# Patient Record
Sex: FEMALE | Race: ASIAN | NOT HISPANIC OR LATINO
[De-identification: names, ages, dates, MRNs, and addresses within clinical notes are randomized per-mention and may not be internally consistent; named-entity substitution may affect disease eponyms.]

---

## 2017-06-10 ENCOUNTER — HEALTH MAINTENANCE LETTER (OUTPATIENT)
Age: 37
End: 2017-06-10

## 2017-11-08 ENCOUNTER — OFFICE VISIT (OUTPATIENT)
Dept: FAMILY MEDICINE | Facility: CLINIC | Age: 37
End: 2017-11-08

## 2017-11-08 VITALS
WEIGHT: 169 LBS | SYSTOLIC BLOOD PRESSURE: 143 MMHG | HEART RATE: 75 BPM | TEMPERATURE: 98.4 F | BODY MASS INDEX: 30.66 KG/M2 | DIASTOLIC BLOOD PRESSURE: 99 MMHG

## 2017-11-08 DIAGNOSIS — Z00.00 HEALTH CARE MAINTENANCE: ICD-10-CM

## 2017-11-08 DIAGNOSIS — Z30.433 ENCOUNTER FOR REMOVAL AND REINSERTION OF INTRAUTERINE CONTRACEPTIVE DEVICE: Primary | ICD-10-CM

## 2017-11-08 DIAGNOSIS — I10 ESSENTIAL HYPERTENSION: ICD-10-CM

## 2017-11-08 LAB
BUN SERPL-MCNC: 7.7 MG/DL (ref 7–19)
CALCIUM SERPL-MCNC: 9.4 MG/DL (ref 8.5–10.1)
CHLORIDE SERPLBLD-SCNC: 101.6 MMOL/L (ref 98–110)
CHOLEST SERPL-MCNC: 220.1 MG/DL (ref 0–200)
CHOLEST/HDLC SERPL: 5.3 {RATIO} (ref 0–5)
CO2 SERPL-SCNC: 29.7 MMOL/L (ref 20–32)
CREAT SERPL-MCNC: 0.6 MG/DL (ref 0.5–1)
GFR SERPL CREATININE-BSD FRML MDRD: >90 ML/MIN/1.7 M2
GLUCOSE SERPL-MCNC: 115.6 MG'DL (ref 70–99)
HDLC SERPL-MCNC: 41.7 MG/DL
LDLC SERPL CALC-MCNC: 147 MG/DL (ref 0–129)
POTASSIUM SERPL-SCNC: 3.6 MMOL/DL (ref 3.2–4.6)
SODIUM SERPL-SCNC: 137.5 MMOL/L (ref 132–142)
TRIGL SERPL-MCNC: 156.6 MG/DL (ref 0–150)
VLDL CHOLESTEROL: 31.3 MG/DL (ref 7–32)

## 2017-11-08 RX ORDER — LOSARTAN POTASSIUM AND HYDROCHLOROTHIAZIDE 12.5; 5 MG/1; MG/1
1 TABLET ORAL DAILY
Qty: 90 TABLET | Refills: 3 | Status: SHIPPED | OUTPATIENT
Start: 2017-11-08 | End: 2020-04-15 | Stop reason: ALTCHOICE

## 2017-11-08 NOTE — PROGRESS NOTES
There are no exam notes on file for this visit.  Chief Complaint   Patient presents with     Contraception     removal and insertion of IUD     Medication Refill     Blood pressure (!) 143/99, pulse 75, temperature 98.4  F (36.9  C), temperature source Oral, weight 169 lb (76.7 kg).    Patient comes in today to restart her high blood pressure medicine.  Additionally, she would like to have her current Mirena taken out and a new one placed.    The patient tells me that she has not been taking her blood pressure medicine over the past year.  She has done this because of insurance problems.  She now has insurance and so would like a refill of her medications.    Patient tells me that she has been amenorrheic since the placement of her previous Mirena.  She has been tolerating it well with occasional spotting on very rare occasions.  Otherwise she is doing quite well and would like a continuation of this.  It has been over 5 years since the previous Mirena was placed.    The patient and I discussed the fact that she has not had a Pap smear recently.  She is willing to get that today.    Objective: This is a well-nourished well-developed female who is in no acute distress.  Please see vital signs as noted above.  The cervix is suspended without difficulty with a nonsterile speculum, and a Pap smear is performed.  Following this, a sterile speculum was inserted and the previous IUD was removed and the new IUD is placed.  Please see procedure note below.    Assessment:  #1 desire for exchange of  intrauterine device #2 hypertension #3 healthcare maintenance    Plan:  for her hypertension I have reviewed her medication for the upcoming year.  Will check a BMP to ensure that her creatinine and electrolytes are normal.  I will check a lipid panel for health care maintenance.    The patient was counseled regarding alternative forms of contraception and all her questions answered to her satisfaction.     Encounter for removal  and reinsertion of intrauterine contraceptive device  -     levonorgestrel (MIRENA) 20 MCG/24HR IUD; 1 each (20 mcg) by Intrauterine route continuous  -     REMOVE INTRAUTERINE DEVICE  -     INSERTION INTRAUTERINE DEVICE  -     HC LEVONORGESTREL IU 52MG 5 YR    Essential hypertension  -     Basic Metabolic Panel ([facility])  - Results < 1 hr  -     Lipid Panel ([place])  -     losartan-hydrochlorothiazide (HYZAAR) 50-12.5 MG per tablet; Take 1 tablet by mouth daily    Health care maintenance  -     GYN Cytology ([facility])    IUD Insertion Note    Demetra Rodrigues is a patient of Markell Urbina here for IUD placement.   Indication: two previous IUDs.  Not ready for permanent procedure yet.  Tolerating Mirena well, 5 years since placement    Counselling: Discussed potential side effects of Paragard, including increased bleeding and cramping, as well as the Mirena, including unpredictable spotting and amenorrhea.  Patient aware to check for strings every month.    Consent: Affirmation of informed consent was signed and scanned into the medical record. Risks, benefits and alternatives were discussed including small risk of uterine perforation during insertion. Patient's questions were elicited and answered.   Procedure safety checklist was completed:  Yes  Time Out (Pause for the Cause) completed: Yes    Labs: UPT not done    Patient chooses this IUD type: Mirena    Technique:   Patient was premedicated with ibuprofen:   No  Uterine position was confirmed by bimanual exam: Yes   Using a sterile speculum and equipment, the cervix  was examined.     The IUD strings were easily visible.  With gentle traction, the IUD was removed without difficulty, and shown to the patient.    A GC-Chlamydia probe was collected:   No     The cervix was cleansed with Betadine prep. Tenaculum was applied to cervix with tension to straighten cervical canal. The uterus was sounded to 7 cm. The Mirena insertion apparatus was prepared and  introduced into the cervix without significant resistance.  The IUD was placed in the uterus. A firm endpoint was felt with the passage of the sound and the IUD.   The strings were trimmed 3 cm below the cervix.     IUD Lot #: [id number]  EBL: minimal  Complications: No.    Tolerance: Pt tolerated procedure well and was in stable condition. The patient had some minor cramping after insertion of the IUD.  This resolved as the patient was in the office.      Follow up: Pt was instructed to call if heavy bleeding, severe cramping or foul smelling discharge. May take 400-600 mg ibuprofen TID prn for mild cramping. Pt should return in one month for IUD string check. Pt instructed she requires a new IUD device in 5 years.     Faculty: Markell Hancock MD present for and performed this entire procedure.

## 2017-11-08 NOTE — LETTER
November 16, 2017      Demetraconnor Rodrigues  [address] [facility] [address]        Dear Demetra,    Your pap smear is normal    Please see below for your test results.    Resulted Orders   Basic Metabolic Panel ([facility])  - Results < 1 hr   Result Value Ref Range    Urea Nitrogen 7.7 7.0 - 19.0 mg/dL    Calcium 9.4 8.5 - 10.1 mg/dL    Chloride 101.6 98.0 - 110.0 mmol/L    Carbon Dioxide 29.7 20.0 - 32.0 mmol/L    Creatinine 0.6 0.5 - 1.0 mg/dL    Glucose 115.6 (H) 70.0 - 99.0 mg'dL    Potassium 3.6 3.2 - 4.6 mmol/dL    Sodium 137.5 132.0 - 142.0 mmol/L    GFR Estimate >90 >60.0 mL/min/1.7 m2    GFR Estimate If Black >90 >60.0 mL/min/1.7 m2   Lipid Panel ([place])   Result Value Ref Range    Cholesterol 220.1 (H) 0.0 - 200.0 mg/dL    Cholesterol/HDL Ratio 5.3 (H) 0.0 - 5.0    HDL Cholesterol 41.7 >40.0 mg/dL    LDL Cholesterol Calculated 147 (H) 0 - 129 mg/dL    Triglycerides 156.6 (H) 0.0 - 150.0 mg/dL    VLDL Cholesterol 31.3 7.0 - 32.0 mg/dL   GYN Cytology ([facility])   Result Value Ref Range    Lab AP Case Report SEE RESULTS BELOW       Comment:      Gynecologic Cytology Report                       Case: [id number]                                     Authorizing Provider:  Markell Mahoney MD      Collected:             11/08/2017 1611              First Screen:          RADHA Carmona       Received:              11/09/2017 1048                                     (ASCP)                                                                         Rescreen:              RADHA Tadeo                                                                               (ASCP)                                                                         Specimen:    SUREPATH PAP, SCREENING, Endocervical/cervical                                               Lab AP Gyn Interpretation SEE RESULTS BELOW       Comment:      Negative for squamous intraepithelial lesion or malignancy  Electronically signed by Floresita Chen  CT (ASCP) on 11/15/2017 at  3:09   PM      Lab AP Malignant? Normal Normal    Lab AP Gyn Other Findings       Bacteria morphologically consistent with Actinomyces spp    Specimen adequacy:       Satisfactory for evaluation, endocervical/transformation zone component present    HPV Reflex? Yes if Abnormal     High Risk? No     Last Mens Period 5 years ago     Lab AP Abnormal Bleeding No     Lab AP Patient Status n/a     Lab AP Birth Control/Hormones IUD-Hormone     Lab AP Previous Normal Uncertain     Lab AP Previous Abnl n/a     Lab AP Cervical Appearance normal     Narrative    Test performed by:  [facility] [address] [place] [address]       If you have any questions, please call the clinic to make an appointment.    Sincerely,    Markell Hancock MD

## 2017-11-08 NOTE — LETTER
November 13, 2017      Demetra Rodrigues  [address] [facility] [address]        Dear Demetra,  Your cholesterol is high.  We should recheck this in 3-6 months.                Please see below for your test results.    Resulted Orders   Basic Metabolic Panel ([facility])  - Results < 1 hr   Result Value Ref Range    Urea Nitrogen 7.7 7.0 - 19.0 mg/dL    Calcium 9.4 8.5 - 10.1 mg/dL    Chloride 101.6 98.0 - 110.0 mmol/L    Carbon Dioxide 29.7 20.0 - 32.0 mmol/L    Creatinine 0.6 0.5 - 1.0 mg/dL    Glucose 115.6 (H) 70.0 - 99.0 mg'dL    Potassium 3.6 3.2 - 4.6 mmol/dL    Sodium 137.5 132.0 - 142.0 mmol/L    GFR Estimate >90 >60.0 mL/min/1.7 m2    GFR Estimate If Black >90 >60.0 mL/min/1.7 m2   Lipid Panel ([place])   Result Value Ref Range    Cholesterol 220.1 (H) 0.0 - 200.0 mg/dL    Cholesterol/HDL Ratio 5.3 (H) 0.0 - 5.0    HDL Cholesterol 41.7 >40.0 mg/dL    LDL Cholesterol Calculated 147 (H) 0 - 129 mg/dL    Triglycerides 156.6 (H) 0.0 - 150.0 mg/dL    VLDL Cholesterol 31.3 7.0 - 32.0 mg/dL       If you have any questions, please call the clinic to make an appointment.    Sincerely,    Markell Hancock MD

## 2017-11-08 NOTE — MR AVS SNAPSHOT
After Visit Summary   2017    Demetra Rodrigues    MRN: [id number]           Patient Information     Date Of Birth          1980        Visit Information        Provider Department      2017 3:30 PM Markell Hancock MD [facility]        Today's Diagnoses     Encounter for removal and reinsertion of intrauterine contraceptive device    -  1    Essential hypertension        Health care maintenance           Follow-ups after your visit        Who to contact     Please call your clinic at [phone] to:    Ask questions about your health    Make or cancel appointments    Discuss your medicines    Learn about your test results    Speak to your doctor   If you have compliments or concerns about an experience at your clinic, or if you wish to file a complaint, please contact [facility] Patient Relations at [phone] or email us at [email or web address]         Additional Information About Your Visit        MyChart Information     W[employer]t is an electronic gateway that provides easy, online access to your medical records. With Kaiam, you can request a clinic appointment, read your test results, renew a prescription or communicate with your care team.     To sign up for [employer] visit the website at [email or web address]   You will be asked to enter the access code listed below, as well as some personal information. Please follow the directions to create your username and password.     Your access code is: [email or web address]  Expires: 2018  5:03 PM     Your access code will  in 90 days. If you need help or a new code, please contact your [facility] or call [phone] for assistance.        Care EveryWhere ID     This is your Care EveryWhere ID. This could be used by other organizations to access your [place] medical records  [id number]        Your Vitals Were     Pulse Temperature BMI (Body Mass Index)              75 98.4  F (36.9  C) (Oral) 30.66 kg/m2          Blood Pressure from Last 3 Encounters:   11/08/17 (!) 143/99   07/12/16 (!) 200/125   12/10/15 (!) 144/103    Weight from Last 3 Encounters:   11/08/17 169 lb (76.7 kg)   07/12/16 166 lb 3.2 oz (75.4 kg)   12/10/15 152 lb 9.6 oz (69.2 kg)              We Performed the Following     Basic Metabolic Panel ([facility])  - Results < 1 hr     GYN Cytology ([facility])     HC LEVONORGESTREL IU 52MG 5 YR     INSERTION INTRAUTERINE DEVICE     Lipid Panel ([place])     REMOVE INTRAUTERINE DEVICE          Today's Medication Changes          These changes are accurate as of: 11/8/17  5:03 PM.  If you have any questions, ask your nurse or doctor.               These medicines have changed or have updated prescriptions.        Dose/Directions    * MIRENA IU   This may have changed:  Another medication with the same name was added. Make sure you understand how and when to take each.   Changed by:  Dominick Rodrigues MD        As directed   Refills:  0       * levonorgestrel 20 MCG/24HR IUD   Commonly known as:  MIRENA   This may have changed:  You were already taking a medication with the same name, and this prescription was added. Make sure you understand how and when to take each.   Used for:  Encounter for removal and reinsertion of intrauterine contraceptive device   Changed by:  Markell Hancock MD        Dose:  1 each   1 each (20 mcg) by Intrauterine route continuous   Refills:  0       * Notice:  This list has 2 medication(s) that are the same as other medications prescribed for you. Read the directions carefully, and ask your doctor or other care provider to review them with you.         Where to get your medicines      These medications were sent to [facility] - [place], [place] - [address], [place] [address]     Phone:  [phone]     losartan-hydrochlorothiazide 50-12.5 MG per tablet         Some of these will need a paper  prescription and others can be bought over the counter.  Ask your nurse if you have questions.     You don't need a prescription for these medications     levonorgestrel 20 MCG/24HR IUD                Primary Care Provider Office Phone # Fax #    Markell Hancock [phone]       [address] [address]        Equal Access to Services     [facility] : Hadii aad ku hadasho Soomaali, waaxda luqadaha, qaybta kaalmada adeegyada, waxay idiin hayaan adeeg kharash la'aan ah. So [facility] [phone].    ATENCIÓN: Si habla español, tiene a pedraza disposición servicios gratuitos de asistencia lingüística. Karen al [phone].    We comply with applicable federal civil rights laws and [place] laws. We do not discriminate on the basis of race, color, national origin, age, disability, sex, sexual orientation, or gender identity.            Thank you!     Thank you for choosing [facility]  for your care. Our goal is always to provide you with excellent care. Hearing back from our patients is one way we can continue to improve our services. Please take a few minutes to complete the written survey that you may receive in the mail after your visit with us. Thank you!             Your Updated Medication List - Protect others around you: Learn how to safely use, store and throw away your medicines at www.disposemymeds.org.          This list is accurate as of: 11/8/17  5:03 PM.  Always use your most recent med list.                   Brand Name Dispense Instructions for use Diagnosis    losartan-hydrochlorothiazide 50-12.5 MG per tablet    HYZAAR    90 tablet    Take 1 tablet by mouth daily    Essential hypertension       * MIRENA IU      As directed        * levonorgestrel 20 MCG/24HR IUD    MIRENA     1 each (20 mcg) by Intrauterine route continuous    Encounter for removal and reinsertion of intrauterine contraceptive device       * Notice:  This list has 2 medication(s) that are  the same as other medications prescribed for you. Read the directions carefully, and ask your doctor or other care provider to review them with you.

## 2017-11-15 ENCOUNTER — RECORDS - HEALTHEAST (OUTPATIENT)
Dept: ADMINISTRATIVE | Facility: OTHER | Age: 37
End: 2017-11-15

## 2017-11-15 LAB
CYTOLOGY CVX/VAG DOC THIN PREP: NORMAL
HIGH RISK?: NO
HPV REFLEX?: NORMAL
LAB AP ABNORMAL BLEEDING: NO
LAB AP BIRTH CONTROL/HORMONES: NORMAL
LAB AP CASE REPORT: NORMAL
LAB AP CERVICAL APPEARANCE: NORMAL
LAB AP GYN OTHER FINDINGS: NORMAL
LAB AP MALIGNANT?: NORMAL
LAB AP PATIENT STATUS: NORMAL
LAB AP PREVIOUS ABNL: NORMAL
LAB AP PREVIOUS NORMAL: NORMAL
LAST MENS PERIOD: NORMAL
SPECIMEN ADEQUACY:: NORMAL

## 2019-09-11 ENCOUNTER — OFFICE VISIT - HEALTHEAST (OUTPATIENT)
Dept: FAMILY MEDICINE | Facility: CLINIC | Age: 39
End: 2019-09-11

## 2019-09-11 DIAGNOSIS — H61.21 IMPACTED CERUMEN OF RIGHT EAR: ICD-10-CM

## 2019-09-11 DIAGNOSIS — R51.9 ACUTE NONINTRACTABLE HEADACHE, UNSPECIFIED HEADACHE TYPE: ICD-10-CM

## 2019-09-11 DIAGNOSIS — M54.2 NECK PAIN: ICD-10-CM

## 2019-09-11 DIAGNOSIS — I10 SEVERE UNCONTROLLED HYPERTENSION: ICD-10-CM

## 2019-09-11 ASSESSMENT — MIFFLIN-ST. JEOR: SCORE: 1388.9

## 2020-04-15 ENCOUNTER — OFFICE VISIT (OUTPATIENT)
Dept: FAMILY MEDICINE | Facility: CLINIC | Age: 40
End: 2020-04-15
Payer: COMMERCIAL

## 2020-04-15 VITALS
RESPIRATION RATE: 16 BRPM | TEMPERATURE: 98.9 F | BODY MASS INDEX: 32.55 KG/M2 | HEART RATE: 87 BPM | OXYGEN SATURATION: 98 % | WEIGHT: 179.4 LBS | DIASTOLIC BLOOD PRESSURE: 97 MMHG | SYSTOLIC BLOOD PRESSURE: 134 MMHG

## 2020-04-15 DIAGNOSIS — I10 ESSENTIAL HYPERTENSION: ICD-10-CM

## 2020-04-15 DIAGNOSIS — R10.2 PELVIC PAIN IN FEMALE: Primary | ICD-10-CM

## 2020-04-15 RX ORDER — LOSARTAN POTASSIUM 50 MG/1
50 TABLET ORAL DAILY
Qty: 90 TABLET | Refills: 0 | Status: SHIPPED | OUTPATIENT
Start: 2020-04-15 | End: 2021-11-18

## 2020-04-15 NOTE — PATIENT INSTRUCTIONS
Pelvic Pain   - IUD strings are in place  - Will order an ultrasound to try and figure out what is going on   - Try naproxen 220 mg twice daily as needed on days you are having pain    Blood pressure  - Start losartan 50 mg daily  - Set reminder in phone to try and remember to take medication  - Check blood pressure daily and record numbers  - If blood pressure regularly elevated above >160 for the top number or >100 for the bottom number, call the clinic  - If blood pressure >180 for the top number or >110 for the bottom number --> Call the clinic   - Call the clinic if headaches, vision changes, shortness of breath, chest pain or one-sided weakness     Patient Education     Established High Blood Pressure  High blood pressure (hypertension) is a chronic disease. Often, healthcare providers don t know what causes it. But it can be caused by certain health conditions and medicines.  If you have high blood pressure, you may not have any symptoms. If you do have symptoms, they may include headache, dizziness, changes in your vision, chest pain, and shortness of breath. But even without symptoms, high blood pressure that s not treated raises your risk for heart attack, heart failure, and stroke. High blood pressure is a serious health risk and shouldn t be ignored.  Blood pressure measurements are given as 2 numbers. Systolic blood pressure is the upper number. This is the pressure when the heart contracts. Diastolic blood pressure is the lower number. This is the pressure when the heart relaxes between beats. You will see your blood pressure readings written together. For example, a person with a systolic pressure of 118 and a diastolic pressure of 78 will have 118/78 written in the medical record.  Blood pressure is categorized as normal, elevated, or stage 1 or stage 2 high blood pressure:    Normal blood pressure is systolic of less than 120 and diastolic of less than 80 (120/80)    Elevated blood pressure is  systolic of 120 to 129 and diastolic less than 80    Stage 1 high blood pressure is systolic is 130 to 139 or diastolic between 80 to 89    Stage 2 high blood pressure is when systolic is 140 or higher or the diastolic is 90 or higher  Home care  If you have high blood pressure, follow these home care guidelines to help lower your blood pressure. If you are taking medicines for high blood pressure, these methods may reduce or end your need for medicines in the future.    Start a weight-loss program if you are overweight.    Cut back on how much salt you get in your diet. Here s how to do this:  ? Don t eat foods that have a lot of salt. These include olives, pickles, smoked meats, and salted potato chips.  ? Don t add salt to your food at the table.  ? Use only small amounts of salt when cooking.    Start an exercise program. Talk with your healthcare provider about the type of exercise program that would be best for you. It doesn't have to be hard. Even brisk walking for 20 minutes 3 times a week is a good form of exercise.    Don t take medicines that stimulate the heart. This includes many over-the-counter cold and sinus decongestant pills and sprays, as well as diet pills. Check the warnings about high blood pressure on the label. Before buying any over-the-counter medicines or supplements, always ask the pharmacist about the product's potential interaction with your high blood pressure and your high blood pressure medicines.    Stimulants such as amphetamine or cocaine could be deadly for someone with high blood pressure. Never take these.    Limit how much caffeine you get in your diet. Switch to caffeine-free products.    Stop smoking. If you are a long-time smoker, this can be hard. Talk to your healthcare provider about medicines and nicotine replacement options to help you. Also, enroll in a stop-smoking program to make it more likely that you will quit for good.    Learn how to handle stress. This is an  important part of any program to lower blood pressure. Learn about relaxation methods like meditation, yoga, or biofeedback.    If your provider prescribed medicines, take them exactly as directed. Missing doses may cause your blood pressure get out of control.    If you miss a dose or doses, check with your healthcare provider or pharmacist about what to do.    Consider buying an automatic blood pressure machine to check your blood pressure at home. Ask your provider for a recommendation. You can get one of these at most pharmacies.     The American Heart Association recommends the following guidelines for home blood pressure monitoring:    Don't smoke or drink coffee for 30 minutes before taking your blood pressure.    Go to the bathroom before the test.    Relax for 5 minutes before taking the measurement.    Sit with your back supported (don't sit on a couch or soft chair); keep your feet on the floor uncrossed. Place your arm on a solid flat surface (like a table) with the upper part of the arm at heart level. Place the middle of the cuff directly above the bend of the elbow. Check the monitor's instruction manual for an illustration.    Take multiple readings. When you measure, take 2 to 3 readings one minute apart and record all of the results.    Take your blood pressure at the same time every day, or as your healthcare provider recommends.    Record the date, time, and blood pressure reading.    Take the record with you to your next medical appointment. If your blood pressure monitor has a built-in memory, simply take the monitor with you to your next appointment.    Call your provider if you have several high readings. Don't be frightened by a single high blood pressure reading, but if you get several high readings, check in with your healthcare provider.    Note: When blood pressure reaches a systolic (top number) of 180 or higher OR diastolic (bottom number) of 110 or higher, seek emergency medical  treatment.  Follow-up care  You will need to see your healthcare provider regularly. This is to check your blood pressure and to make changes to your medicines. Make a follow-up appointment as directed. Bring the record of your home blood pressure readings to the appointment.  When to seek medical advice  Call your healthcare provider right away if any of these occur:    Blood pressure reaches a systolic (upper number) of 180 or higher OR a diastolic (bottom number) of 110 or higher    Chest pain or shortness of breath    Severe headache    Throbbing or rushing sound in the ears    Nosebleed    Sudden severe pain in your belly (abdomen)    Extreme drowsiness, confusion, or fainting    Dizziness or spinning sensation (vertigo)    Weakness of an arm or leg or one side of the face    You have problems speaking or seeing   Date Last Reviewed: 12/1/2016 2000-2019 [employer]. [address] [place] [address]. All rights reserved. This information is not intended as a substitute for professional medical care. Always follow your healthcare professional's instructions.           04/15/20   TRANSVAGINAL   Appointment: Monday April 20th  Time: 8:00am  Provider: Floresita Tang  Location: [facility]    PREP INSTRUCTIONS: Full bladder at time of US    Alma Hebert

## 2020-04-15 NOTE — PROGRESS NOTES
Preceptor Attestation:   Patient seen, evaluated and discussed with the resident. I have verified the content of the note, which accurately reflects my assessment of the patient and the plan of care.   Supervising Physician:  Markell Hancock MD

## 2020-04-15 NOTE — PROGRESS NOTES
SUBJECTIVE       Demetra Rodrigues is a 39 year old  female with a PMH significant for:     Patient Active Problem List   Diagnosis     Health Care Home     Headache     HTN (hypertension)     Fatigue     S/P LEEP of cervix     Abnormal Pap smear of cervix     She presents with pelvic pain and right sided abdominal pain.     Pelvic pain: Intermittent pain for the last month, typically 1-2 days after intercourse for approximately 1 day and then may happen one more time. It is becoming somewhat more frequent, but the pain hasn't increased. Doesn't notice this pain every time after intercourse.  Notes that it is a sharp pain that does radiate into her right side. No pain during intercourse itself. No pain with voiding. No increased urinary frequency. No vaginal discharge. Gets spotting 1-2 times per year with IUD in place. She has had IUD's previously, but never had this pain in the past. Desires to keep IUD in place, unless malpositioned    Elevated blood pressure: Patient didn't  her losartan-hydrochlorothiazide due to difficulties with insurance. She hasn't been taking her medication for at least several months. She hasn't had , vision changes, shortness of breath or chest pain, unilateral weakness. She does get headaches intermittently and feels that this is when her blood pressure is high. She has a blood pressure cuff at home and will occasionally check. When she has had a headache, the highest she has had is 172/113. She notes that it is hard for her to remember taking her blood pressure medication every day. She notes that if she takes it every day that her blood pressure drops to the low 100s and she feels very tired.     PMH, Medications and Allergies were reviewed and updated as needed.        REVIEW OF SYSTEMS     CONSTITUTIONAL: NEGATIVE for fever, chills. POSITIVE for weight gain  INTEGUMENTARY/SKIN: NEGATIVE for worrisome rashes, moles or lesions  RESP: NEGATIVE for significant cough or SOB  CV:  NEGATIVE for chest pain, palpitations   GI: NEGATIVE for nausea  : NEGATIVE for frequency, dysuria, or hematuria  NEURO: NEGATIVE for weakness, dizziness or paresthesias  PSYCHIATRIC: NEGATIVE for changes in mood or affect        OBJECTIVE     Vitals:    04/15/20 1241   BP: (!) 164/103   BP Location: Left arm   Patient Position: Sitting   Cuff Size: Adult Regular   Pulse: 87   Resp: 16   Temp: 98.9  F (37.2  C)   TempSrc: Oral   SpO2: 98%   Weight: 81.4 kg (179 lb 6.4 oz)     Body mass index is 32.55 kg/m .    Constitutional: Awake, alert, cooperative, no apparent distress, and appears stated age.  Lungs: No increased work of breathing, good air exchange, clear to auscultation bilaterally, no crackles or wheezing.  Cardiovascular: Regular rate and rhythm, normal S1 and S2, no S3 or S4, and no murmur noted.  Abdomen: No scars, normal bowel sounds, soft, non-distended, non-tender, no masses palpated, no hepatosplenomegaly.  Genitourinary: No urethral discharge, normal external genitalia. IUD strings present, slightly long, but IUD not visible in the external os. No cervical motion tenderness. Unable to palpate ovaries well on bimanual exam due to body habitus.   Neuropsychiatric: Normal affect, mood, orientation, memory and insight.  Skin: No rashes, erythema, pallor, petechia or purpura.    No results found for this or any previous visit (from the past 24 hour(s)).        ASSESSMENT AND PLAN     1. Pelvic pain in female  Patient with one month history of intermittent pelvic pain. Possible ovarian cyst, fibroids, IUD embedded in myometrium, endometriosis. It is possible that related to atrophy and inadequate lubrication, but feel that this is less likely as patient not having any pain during intercourse. No cervical motion tenderness or vaginal discharge making PID unlikely and patient low risk so deferred GC/Chlamydia testing. Will perform ultrasound to try and determine etiology of pain. Discussed red flag  symptoms and return precautions with patient. Recommended trying Aleve twice daily as needed for pain and to take medication with food.  - Pelvis Transvaginal (In Clinic); Future    2. Essential hypertension  Patient with long-standing history of hypertension. She was previously on losartan-hydrochlorothiazide, but didn't like taking the medication every day because it made her tired. Will restart losartan only today and see if this provides adequate control. Recommended setting alarm in phone to remember medication and checking blood pressure every day and recording this. Discussed proper technique to check blood pressure, red flag symptoms and return precautions. Will need BMP in 2-3 weeks after starting losartan and will have a phone visit in 2 weeks for follow up on blood pressure, but sooner if concerns.   - losartan (COZAAR) 50 MG tablet; Take 1 tablet (50 mg) by mouth daily  Dispense: 90 tablet; Refill: 0        RTC in 2 weeks for follow up of hypertension and pelvic pain (phone visit) or sooner if develops new or worsening symptoms.    Patient discussed with Dr. Hancock, who agrees with the above assessment and plan.      Daisy Tsang MD, PGY2

## 2020-04-20 DIAGNOSIS — R10.2 PELVIC PAIN IN FEMALE: Primary | ICD-10-CM

## 2020-04-20 DIAGNOSIS — R10.2 PELVIC PAIN IN FEMALE: ICD-10-CM

## 2020-04-22 NOTE — RESULT ENCOUNTER NOTE
Attempted to call patient with results. No answer and left message.     Uterus normal size with no masses. IUD in proper position. Right ovary with 3.7cm simple cyst c/w dominant follicle, otherwise normal. Left ovary normal.     If patient calls back: Results reassuring. There is a small cyst on the right ovary. These are common and this one doesn't have any worrisome findings. Left ovary is normal. IUD is in correct position. No fibroids. It is still unclear what is causing pelvic pain based on results. It is possibly related to small right ovarian cyst, but usually this wouldn't cause the pain she is describing. Reassuring that IUD is in the correct location. Recommend continuing to monitor for now, but have patient be seen right away if pain becomes severe, persistent, develops fevers, chills, new vaginal discharge or heavy vaginal bleeding. Patient due for phone visit in 2 weeks to discuss hypertension. Can also follow up on pelvic pain at that time, but sooner if concerns.     If patient calls back, please schedule virtual visit in 2 weeks for HTN and pelvic pain.     Discussed with Dr. Hancock who agrees with assessment and plan.

## 2020-04-23 ENCOUNTER — OFFICE VISIT (OUTPATIENT)
Dept: FAMILY MEDICINE | Facility: CLINIC | Age: 40
End: 2020-04-23
Payer: COMMERCIAL

## 2020-04-23 VITALS
BODY MASS INDEX: 32.11 KG/M2 | OXYGEN SATURATION: 99 % | TEMPERATURE: 99.2 F | DIASTOLIC BLOOD PRESSURE: 101 MMHG | SYSTOLIC BLOOD PRESSURE: 143 MMHG | RESPIRATION RATE: 16 BRPM | HEART RATE: 69 BPM | WEIGHT: 177 LBS

## 2020-04-23 DIAGNOSIS — S63.642A SPRAIN OF METACARPOPHALANGEAL (MCP) JOINT OF LEFT THUMB, INITIAL ENCOUNTER: ICD-10-CM

## 2020-04-23 DIAGNOSIS — S69.92XA THUMB INJURY, LEFT, INITIAL ENCOUNTER: Primary | ICD-10-CM

## 2020-04-23 RX ORDER — IBUPROFEN 600 MG/1
600 TABLET, FILM COATED ORAL EVERY 8 HOURS PRN
Qty: 60 TABLET | Refills: 0 | Status: SHIPPED | OUTPATIENT
Start: 2020-04-23 | End: 2021-12-16

## 2020-04-23 NOTE — PROGRESS NOTES
SUBJECTIVE       Demetra Rodrigues is a 39 year old female with a PMH significant for   Patient Active Problem List   Diagnosis     Health Care Home     Headache     HTN (hypertension)     Fatigue     S/P LEEP of cervix     Abnormal Pap smear of cervix    who presents for evaluation of thumb injury.    Thumb injury  Patient coming in today for evaluation of an injury she sustained during volleyball game with her  and her son .  She reports trying to hit the ball mostly upwards she felt that the ball hit and felt immediate pain.  She reports icing it for about 20 minutes and states that she took aspirin 200 mg.  She woke up this morning with worsening pain shooting down her arm.  Reports now that the shooting pain is improved she has been trying to do some exercises to help her range of motion.  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  She denies any discoloration, weakness and states that the swelling is that this is now better.    Hypertension  Ultrasound results  Patient also has known history of hypertension, blood pressure is elevated today.  Patient was recently started on losartan which she states that she did not take in reports that she has not been taking consistently.  Reports that her blood pressures have been elevated up to the 180s and so she feels like this is an okay number.  Patient of note also had a recent vaginal ultrasound for lower abdominal discomfort in the setting of having an intrauterine device.  She had an ultrasound completed that showed IUD is in appropriate position did not show any other significant findings.  Reports was discussed with patient today.    Patient speaks English, so an  was not used.    Medications and problem list have been reviewed and updated.         REVIEW OF SYSTEMS     General: No fevers, chills  Head: No headache, dizziness  Neck: No swallowing problems   Resp: No cough. No congestion  GI: No constipation, diarrhea,  no nausea or vomiting  MSK: Left-sided thumb pain.  Skin: No rash        OBJECTIVE     Vitals:    04/23/20 0907 04/23/20 0910   BP: (!) 141/101 (!) 143/101   BP Location: Left arm Left arm   Pulse: 69    Resp: 16    Temp: 99.2  F (37.3  C)    TempSrc: Oral    SpO2: 99%    Weight: 80.3 kg (177 lb)      Body mass index is 32.11 kg/m .  Gen:  In NAD, good color, appears well hydrated  HEENT: No Scleral icterus or conjunctival injection  Neck: Did not assess.  CV: Hypertensive, well perfused.  Pulm: Normal respiratory rate, breathing comfortably on room air.  Abdomen: Did not assess.  MSK: Left edema or swelling no ecchymosis.  Range of motion abduction adduction, flexion and extension are limited due to CP joint pain or tenderness to patient and her tapered-tip joints.  Good cap refill.  Vascular flow is intact.  No other joint issues appreciated.  Skin: No rashes or lesions noted.    No results found for this or any previous visit (from the past 24 hour(s)).    ASSESSMENT AND PLAN     Demetra was seen today for other.    Diagnoses and all orders for this visit:    Thumb injury, left, initial encounter  Sprain of MCP joint of left thumb, initial encounter  Patient coming in after volleyball injury yesterday with ongoing discomfort and shooting pain.  Exam with no obvious deformity or swelling or ecchymosis however notable for some discomfort to palpation around the MCP joints.  X-ray did not show any obvious fractures.  Likely injury is sprain.  Discussed trying to rest her hand as much as possible.  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  She is willing to stay off tomorrow and go back fifth.  Unfortunately we cannot have spica here in clinic and I offered to send a prescription for her however she declines this.  Use an Ace wrap instead.  Return precautions are discussed.  -     ibuprofen (ADVIL/MOTRIN) 600 MG tablet; Take 1 tablet (600 mg) by mouth every 8 hours as needed for  moderate pain  -     XR FINGER LT G/E 2 VW    Hypertension  Recommended patient try and continue to take your medications regularly.  She is going to try a do this continue to try to monitor her blood pressures at home.    Options for treatment and/or follow-up care were reviewed with the patient who was engaged and actively involved in the decision making process and verbalized understanding of the options discussed and was satisfied with the final plan. Risks and benefits of plan were carefully reviewed.     I, Kelvin Reeves, have discussed patient findings with attending physician Alexandria Weldon MD who was agreeable with plan.     Kelvin Reeves MD, PGY3

## 2020-04-23 NOTE — PROGRESS NOTES
Preceptor attestation:  Vital signs reviewed: BP (!) 143/101 (BP Location: Left arm)   Pulse 69   Temp 99.2  F (37.3  C) (Oral)   Resp 16   Wt 80.3 kg (177 lb)   SpO2 99%   BMI 32.11 kg/m      Patient seen, evaluated, and discussed with the resident.  I have verified the content of the note, which accurately reflects my assessment of the patient and the plan of care.    I reviewed the x-ray, and I agree with Dr. Reeves's interpretation.    Supervising physician: Alexandria Weldon MD  [facility]

## 2020-04-23 NOTE — LETTER
April 23, 2020      Demetra Rodrigues  [address] [place] [address]        To Whom It May Concern:    Demetra Rodrigues was seen in our clinic. She may return to work without restrictions on 4/25/20.      Sincerely,        Kelvin Reeves MD

## 2021-04-22 ENCOUNTER — OFFICE VISIT (OUTPATIENT)
Dept: FAMILY MEDICINE | Facility: CLINIC | Age: 41
End: 2021-04-22
Payer: COMMERCIAL

## 2021-04-22 VITALS
BODY MASS INDEX: 31.64 KG/M2 | TEMPERATURE: 98.9 F | WEIGHT: 174.4 LBS | DIASTOLIC BLOOD PRESSURE: 113 MMHG | RESPIRATION RATE: 16 BRPM | SYSTOLIC BLOOD PRESSURE: 165 MMHG | HEART RATE: 103 BPM | OXYGEN SATURATION: 99 %

## 2021-04-22 DIAGNOSIS — I10 ESSENTIAL HYPERTENSION: Primary | ICD-10-CM

## 2021-04-22 PROCEDURE — 99214 OFFICE O/P EST MOD 30 MIN: CPT | Mod: GC | Performed by: STUDENT IN AN ORGANIZED HEALTH CARE EDUCATION/TRAINING PROGRAM

## 2021-04-22 RX ORDER — AMLODIPINE BESYLATE 10 MG/1
10 TABLET ORAL DAILY
Qty: 30 TABLET | Refills: 0 | Status: SHIPPED | OUTPATIENT
Start: 2021-04-22 | End: 2021-05-06

## 2021-04-22 RX ORDER — HYDROCHLOROTHIAZIDE 12.5 MG/1
12.5 TABLET ORAL DAILY
Qty: 30 TABLET | Refills: 0 | Status: SHIPPED | OUTPATIENT
Start: 2021-04-22 | End: 2021-05-06

## 2021-04-22 NOTE — PROGRESS NOTES
Assessment & Plan     Essential hypertension  Pt with elevated BP today, most significantly her diastolic pressures. She had cough with ACEs and fatigue reported with ARB. Will try other first line agents as below and follow up in two weeks.  - amLODIPine (NORVASC) 10 MG tablet; Take 1 tablet (10 mg) by mouth daily  - hydrochlorothiazide (HYDRODIURIL) 12.5 MG tablet; Take 1 tablet (12.5 mg) by mouth daily    Follow up in two weeks for HTN     Precepted with Dr Markell Vuong MD  [facility] CYRIL Mccrary is a 40 year old who presents for the following health issues     HPI   Pt is here for follow up of hypertension. She previously followed up here and had somewhat better control. In 2020 she was 134/97. She has not had any antihypertensives in several months. She comes in today because she is experiencing frequent mild headaches. She reports tension headaches which come on with stress, though she worries these may signal her BP is too high. Her home BP readings have been 150s-160s/100-110s. She has been having increased stress on account of working 2 jobs and having workplace changes after closure of [facility] [place] [facility]. She is also undergoing dental work.    Pt was last prescribed losartan 50mg. She initially tried lisinopril but developed a cough on lisinopril. Losartan makes her feel fatigued she says. She has also been on combined Losartan-hydrochlorothiazide in the past. Pt is a casual smoker 2-3 cigs per day. She drinks 2-3 drinks per week.       Review of Systems   Constitutional, HEENT, cardiovascular, pulmonary, gi and gu systems are negative, except as otherwise noted.      Objective    BP (!) 165/113 (BP Location: Left arm, Patient Position: Sitting, Cuff Size: Adult Regular)   Pulse 103   Temp 98.9  F (37.2  C) (Oral)   Resp 16   Wt 79.1 kg (174 lb 6.4 oz)   SpO2 99%   BMI 31.64 kg/m    Body mass index is 31.64 kg/m .  Physical Exam    GENERAL: healthy, alert and no distress  NECK: no adenopathy, no asymmetry, masses, or scars and thyroid normal to palpation  RESP: lungs clear to auscultation - no rales, rhonchi or wheezes  CV: regular rate and rhythm, normal S1 S2, no S3 or S4, no murmur, click or rub, no peripheral edema and peripheral pulses strong  MS: no gross musculoskeletal defects noted, no edema  NEURO: Normal strength and tone, mentation intact and speech normal  PSYCH: mentation appears normal, affect normal/bright

## 2021-05-06 ENCOUNTER — OFFICE VISIT (OUTPATIENT)
Dept: FAMILY MEDICINE | Facility: CLINIC | Age: 41
End: 2021-05-06
Payer: COMMERCIAL

## 2021-05-06 VITALS
BODY MASS INDEX: 31.61 KG/M2 | OXYGEN SATURATION: 97 % | HEART RATE: 106 BPM | TEMPERATURE: 98.9 F | RESPIRATION RATE: 16 BRPM | WEIGHT: 174.2 LBS | SYSTOLIC BLOOD PRESSURE: 127 MMHG | DIASTOLIC BLOOD PRESSURE: 87 MMHG

## 2021-05-06 DIAGNOSIS — I10 ESSENTIAL HYPERTENSION: Primary | ICD-10-CM

## 2021-05-06 DIAGNOSIS — R53.83 FATIGUE, UNSPECIFIED TYPE: ICD-10-CM

## 2021-05-06 LAB
BUN SERPL-MCNC: 11.1 MG/DL (ref 7–19)
CALCIUM SERPL-MCNC: 8.8 MG/DL (ref 8.5–10.1)
CHLORIDE SERPLBLD-SCNC: 96.8 MMOL/L (ref 98–110)
CO2 SERPL-SCNC: 29.3 MMOL/L (ref 20–32)
CREAT SERPL-MCNC: 0.7 MG/DL (ref 0.5–1)
ERYTHROCYTE [DISTWIDTH] IN BLOOD BY AUTOMATED COUNT: 12 % (ref 10–15)
GFR SERPL CREATININE-BSD FRML MDRD: >90 ML/MIN/1.7 M2
GLUCOSE SERPL-MCNC: 118.3 MG'DL (ref 70–99)
HBA1C MFR BLD: 5.6 % (ref 4.1–5.7)
HCT VFR BLD AUTO: 38.5 % (ref 35–47)
HEMOGLOBIN: 13 G/DL (ref 11.7–15.7)
MCH RBC QN AUTO: 30.4 PG (ref 26.5–35)
MCHC RBC AUTO-ENTMCNC: 33.8 G/DL (ref 32–36)
MCV RBC AUTO: 90 FL (ref 78–100)
PLATELET # BLD AUTO: 315 10E9/L (ref 150–450)
POTASSIUM SERPL-SCNC: 3.5 MMOL/L (ref 3.2–4.6)
RBC # BLD AUTO: 4.3 10E12/L (ref 3.8–5.2)
SODIUM SERPL-SCNC: 131.6 MMOL/L (ref 132–142)
TSH SERPL DL<=0.05 MIU/L-ACNC: 0.96 UIU/ML (ref 0.3–5)
WBC # BLD AUTO: 6.5 10E9/L (ref 4–11)

## 2021-05-06 PROCEDURE — 36415 COLL VENOUS BLD VENIPUNCTURE: CPT | Performed by: STUDENT IN AN ORGANIZED HEALTH CARE EDUCATION/TRAINING PROGRAM

## 2021-05-06 PROCEDURE — 99214 OFFICE O/P EST MOD 30 MIN: CPT | Mod: GC | Performed by: STUDENT IN AN ORGANIZED HEALTH CARE EDUCATION/TRAINING PROGRAM

## 2021-05-06 PROCEDURE — 80048 BASIC METABOLIC PNL TOTAL CA: CPT | Performed by: STUDENT IN AN ORGANIZED HEALTH CARE EDUCATION/TRAINING PROGRAM

## 2021-05-06 PROCEDURE — 83036 HEMOGLOBIN GLYCOSYLATED A1C: CPT | Performed by: STUDENT IN AN ORGANIZED HEALTH CARE EDUCATION/TRAINING PROGRAM

## 2021-05-06 PROCEDURE — 85027 COMPLETE CBC AUTOMATED: CPT | Performed by: STUDENT IN AN ORGANIZED HEALTH CARE EDUCATION/TRAINING PROGRAM

## 2021-05-06 RX ORDER — HYDROCHLOROTHIAZIDE 12.5 MG/1
12.5 TABLET ORAL DAILY
Qty: 30 TABLET | Refills: 0 | Status: SHIPPED | OUTPATIENT
Start: 2021-05-06 | End: 2021-05-25

## 2021-05-06 RX ORDER — AMLODIPINE BESYLATE 10 MG/1
10 TABLET ORAL DAILY
Qty: 30 TABLET | Refills: 0 | Status: SHIPPED | OUTPATIENT
Start: 2021-05-06 | End: 2021-05-25

## 2021-05-06 NOTE — PROGRESS NOTES
"    Assessment & Plan     Essential hypertension  Patient's hypertension looks well controlled today however her home reports show stage I-II blood pressure readings.  She also has tachycardic which could be due to amlodipine however she was tachycardic before starting amlodipine.  We will continue current regimen per patient's request as she does not want to drop too low.  Will reevaluate at follow-up in 3 weeks.  - Basic Metabolic Panel ([place])  - amLODIPine (NORVASC) 10 MG tablet; Take 1 tablet (10 mg) by mouth daily  - hydrochlorothiazide (HYDRODIURIL) 12.5 MG tablet; Take 1 tablet (12.5 mg) by mouth daily    Fatigue, unspecified type  Patient reporting ongoing fatigue hair loss and some tachycardia which could be due to thyroid issues.  Could also be related to body habitus or anemia.  Will start with screening labs as below.     - CBC with Plt ([facility])  - Thyroid [place] ([facility])  - Hemoglobin A1c ([facility])    Precepted with Dr. Christian Hodges MD  [facility] CYRIL Mccrary is a 40 year old who presents for the following health issues    HPI   Pt comes in for follow up of htn. She recently started Amlodipine and hydrochlorothiazide after approximately 1 year of medication non-compliance. She has been taking her medications around 9 am and checks her BP. She has had blood pressure readings in the 140-167 systolic and  diastolic. She has not missed taking her medications, and states that      Pt also reports that she has had ongoing fatigue. She is concerned that the fatigue is related to her blood pressure medications. In addition the the fatigue she has hair loss for the past year. \"hair coming out in clumps\". No constipation, no cold or heat intolerance. No joint pains. Sometimes feels that her heart is racing, feels this is due to smoking. Denies anxiety and diarrhea. Has difficulty sleeping for the past year.         Review of Systems "   Constitutional, HEENT, cardiovascular, pulmonary, gi and gu systems are negative, except as otherwise noted.      Objective    /87 (BP Location: Left arm, Patient Position: Sitting, Cuff Size: Adult Regular)   Pulse 106   Temp 98.9  F (37.2  C) (Oral)   Resp 16   Wt 79 kg (174 lb 3.2 oz)   SpO2 97%   BMI 31.61 kg/m    Body mass index is 31.61 kg/m .  Physical Exam   GENERAL: healthy, alert and no distress  NECK: no adenopathy, no asymmetry, masses, or scars and thyroid normal to palpation  RESP: lungs clear to auscultation - no rales, rhonchi or wheezes  CV: regular rate and rhythm, normal S1 S2, no S3 or S4, no murmur, click or rub, no peripheral edema and peripheral pulses strong  MS: no gross musculoskeletal defects noted, no edema  SKIN: no suspicious lesions or rashes  NEURO: Normal strength and tone, mentation intact and speech normal  PSYCH: mentation appears normal, affect normal/bright    Results for orders placed or performed in visit on 05/06/21   Basic Metabolic Panel ([place])     Status: Abnormal   Result Value Ref Range    Urea Nitrogen 11.1 7.0 - 19.0 mg/dL    Calcium 8.8 8.5 - 10.1 mg/dL    Chloride 96.8 (L) 98.0 - 110.0 mmol/L    Carbon Dioxide 29.3 20.0 - 32.0 mmol/L    Creatinine 0.7 0.5 - 1.0 mg/dL    Glucose 118.3 (H) 70.0 - 99.0 mg'dL    Potassium 3.5 3.2 - 4.6 mmol/L    Sodium 131.6 (L) 132.0 - 142.0 mmol/L    GFR Estimate >90 >60.0 mL/min/1.7 m2   CBC with Plt ([facility])     Status: None   Result Value Ref Range    WBC 6.5 4.0 - 11.0 10e9/L    RBC 4.3 3.8 - 5.2 10e12/L    Hemoglobin 13.0 11.7 - 15.7 g/dL    Hematocrit 38.5 35.0 - 47.0 %    MCV 90.0 78.0 - 100.0 fL    MCH 30.4 26.5 - 35.0 pg    MCHC 33.8 32.0 - 36.0 g/dL    RDW 12 10 - 15 %    Platelets 315.0 150.0 - 450.0 10e9/L   Hemoglobin A1c (P )     Status: None   Result Value Ref Range    Hemoglobin A1C 5.6 4.1 - 5.7 %

## 2021-05-07 ASSESSMENT — ANXIETY QUESTIONNAIRES
1. FEELING NERVOUS, ANXIOUS, OR ON EDGE: NOT AT ALL
2. NOT BEING ABLE TO STOP OR CONTROL WORRYING: NOT AT ALL
IF YOU CHECKED OFF ANY PROBLEMS ON THIS QUESTIONNAIRE, HOW DIFFICULT HAVE THESE PROBLEMS MADE IT FOR YOU TO DO YOUR WORK, TAKE CARE OF THINGS AT HOME, OR GET ALONG WITH OTHER PEOPLE: NOT DIFFICULT AT ALL
GAD7 TOTAL SCORE: 0
7. FEELING AFRAID AS IF SOMETHING AWFUL MIGHT HAPPEN: NOT AT ALL
6. BECOMING EASILY ANNOYED OR IRRITABLE: NOT AT ALL
3. WORRYING TOO MUCH ABOUT DIFFERENT THINGS: NOT AT ALL
5. BEING SO RESTLESS THAT IT IS HARD TO SIT STILL: NOT AT ALL

## 2021-05-07 ASSESSMENT — PATIENT HEALTH QUESTIONNAIRE - PHQ9
5. POOR APPETITE OR OVEREATING: NOT AT ALL
SUM OF ALL RESPONSES TO PHQ QUESTIONS 1-9: 1

## 2021-05-07 NOTE — PROGRESS NOTES
Preceptor Attestation:    I discussed the patient with the resident and evaluated the patient in person. I have verified the content of the note, which accurately reflects my assessment of the patient and the plan of care.   Supervising Physician:  Christian Arango MD.

## 2021-05-08 ASSESSMENT — ANXIETY QUESTIONNAIRES: GAD7 TOTAL SCORE: 0

## 2021-05-25 DIAGNOSIS — I10 ESSENTIAL HYPERTENSION: ICD-10-CM

## 2021-05-25 RX ORDER — AMLODIPINE BESYLATE 10 MG/1
10 TABLET ORAL DAILY
Qty: 30 TABLET | Refills: 0 | Status: SHIPPED | OUTPATIENT
Start: 2021-05-25 | End: 2021-06-23

## 2021-05-25 RX ORDER — HYDROCHLOROTHIAZIDE 12.5 MG/1
12.5 TABLET ORAL DAILY
Qty: 30 TABLET | Refills: 0 | Status: SHIPPED | OUTPATIENT
Start: 2021-05-25 | End: 2021-06-23

## 2021-05-26 ENCOUNTER — RECORDS - HEALTHEAST (OUTPATIENT)
Dept: ADMINISTRATIVE | Facility: CLINIC | Age: 41
End: 2021-05-26

## 2021-05-27 ENCOUNTER — RECORDS - HEALTHEAST (OUTPATIENT)
Dept: ADMINISTRATIVE | Facility: CLINIC | Age: 41
End: 2021-05-27

## 2021-05-29 ENCOUNTER — RECORDS - HEALTHEAST (OUTPATIENT)
Dept: ADMINISTRATIVE | Facility: CLINIC | Age: 41
End: 2021-05-29

## 2021-05-31 ENCOUNTER — RECORDS - HEALTHEAST (OUTPATIENT)
Dept: ADMINISTRATIVE | Facility: CLINIC | Age: 41
End: 2021-05-31

## 2021-06-01 NOTE — PATIENT INSTRUCTIONS - HE
I spoke to the physician on duty at [facility] emergency department about your symptoms, and my concern that you need to have your blood pressure control this evening which may be related to your current symptoms.  Have your friend drive you to the ER this evening where they should be expecting you soon.  They can also address your earwax impaction of your right ear in the ER if needed.

## 2021-06-01 NOTE — PROGRESS NOTES
"Chief Complaint   Patient presents with     Dizziness     started yesterday     Headache     since Monday night. had the flu shot on Monday     Neck Pain     front of the neck to the back of the neck- can't open her mouth fully to eat- painful riding in a car     Eye Pain     after takng a nap today        History of Present Illness: Rooming staff notes reviewed. Patient does not have any eye pain at time of exam. She had a mild fever 2 days ago when she got her influenza vaccination. She woke up to right sided neck pain the morning she got the vaccination, with the neck pain starting before the vacintation. The neck pain persists despite using topical \"Bengay\" and oral acetaminophen. Opening her mouth caused right occipital neck pain. She had not neck pain when she went to bed the night before it started. No vision problems at time of exam. She came to clinic due to the combination of her neck and eye pain. The right eye pain lasted about 20 minutes, being located in the lateral and superior orbit area.  Her blood pressure is noted to be exceptionally high at time of exam.  She told me she has not taken her antihypertensive medication for a couple years because of lack of insurance.    Review of systems: See history of present illness, all others negative.     No current outpatient medications on file.     No current facility-administered medications for this visit.      No past medical history on file.   No past surgical history on file.   Social History     Tobacco Use     Smoking status: Current Some Day Smoker     Smokeless tobacco: Never Used   Substance Use Topics     Alcohol use: Not on file     Drug use: Not on file        No family history on file.    Vitals:    09/11/19 1835 09/11/19 1917   BP: (!) 174/134 (!) 206/131   Patient Site: Right Arm Right Arm   Patient Position: Sitting Sitting   Cuff Size: Adult Large Adult Regular   Pulse: 77    Resp: 18    Temp: 98.8  F (37.1  C)    TempSrc: Oral    SpO2: " "100%    Weight: 171 lb (77.6 kg)    Height: 5' 2\" (1.575 m)      Wt Readings from Last 3 Encounters:   09/11/19 171 lb (77.6 kg)   09/11/19 171 lb (77.6 kg)     Temp Readings from Last 3 Encounters:   09/11/19 99.2  F (37.3  C) (Oral)   09/11/19 98.8  F (37.1  C) (Oral)     BP Readings from Last 3 Encounters:   09/11/19 (!) 210/135   09/11/19 (!) 206/131     Pulse Readings from Last 3 Encounters:   09/11/19 76   09/11/19 77       EXAM:   General: Vital signs reviewed. Patient is in some distress due to right sided neck discomfort. Breathing is non labored appearing. Patient is alert and oriented x 3. She looks more drowsy than normal, and tends to move slowly between the exam room chair and table.  ENT exam: Left tympanic membrane is partially viewable, without injection noted.  Right tympanic membrane is not visible due to cerumen impaction of right ear canal.  Nasal turbinates are noninjected or edematous but no rhinorrhea.  No pharyngeal injection  Eye exam: Pupils are equal round and reactive to light and normal size.  Patient has a normal consensual resting eye gaze.  No scleral injection or other discoloration.  No periorbital injection or edema.  Neck exam: Patient is tender over her right occipital area, and mastoid area.  She tends to hold her head and neck in a neutral forward-looking position, not moving it much due to neck discomfort along her right cervical muscles.  I do not see any neck edema.  No spinal tenderness.  Heart: Heart rate is regular without murmur.  Lungs: Lungs are clear to auscultation with good airflow bilaterally.  Skin: Warm and dry    Assessment/Plan   1. Severe uncontrolled hypertension     2. Acute nonintractable headache, unspecified headache type     3. Neck pain     4. Impacted cerumen of right ear         Patient Instructions   I spoke to the physician on duty at [facility] emergency department about your symptoms, and my concern that you need to have your blood " pressure control this evening which may be related to your current symptoms.  Have your friend drive you to the ER this evening where they should be expecting you soon.  They can also address your earwax impaction of your right ear in the ER if needed.     Usman Myles, DO

## 2021-06-03 VITALS
DIASTOLIC BLOOD PRESSURE: 131 MMHG | OXYGEN SATURATION: 100 % | WEIGHT: 171 LBS | SYSTOLIC BLOOD PRESSURE: 206 MMHG | BODY MASS INDEX: 31.47 KG/M2 | RESPIRATION RATE: 18 BRPM | TEMPERATURE: 98.8 F | HEART RATE: 77 BPM | HEIGHT: 62 IN

## 2021-06-23 DIAGNOSIS — I10 ESSENTIAL HYPERTENSION: ICD-10-CM

## 2021-06-23 RX ORDER — AMLODIPINE BESYLATE 10 MG/1
TABLET ORAL
Qty: 30 TABLET | Refills: 0 | Status: SHIPPED | OUTPATIENT
Start: 2021-06-23 | End: 2021-08-16

## 2021-06-23 RX ORDER — HYDROCHLOROTHIAZIDE 12.5 MG/1
TABLET ORAL
Qty: 30 TABLET | Refills: 0 | Status: SHIPPED | OUTPATIENT
Start: 2021-06-23 | End: 2021-08-16

## 2021-11-16 NOTE — PROGRESS NOTES
{PROVIDER CHARTING PREFERENCE:501034}    Subjective   Demetra is a 41 year old who presents for the following health issues {ACCOMPANIED BY STATEMENT (Optional):576666}    HPI     {SUPERLIST (Optional):180538}  {additonal problems for provider to add (Optional):537912}    Review of Systems   {ROS COMP (Optional):902355}      Objective    There were no vitals taken for this visit.  There is no height or weight on file to calculate BMI.  Physical Exam   {Exam List (Optional):313197}    {Diagnostic Test Results (Optional):825954}    {AMBULATORY ATTESTATION (Optional):952090}

## 2021-11-18 ENCOUNTER — OFFICE VISIT (OUTPATIENT)
Dept: FAMILY MEDICINE | Facility: CLINIC | Age: 41
End: 2021-11-18
Payer: COMMERCIAL

## 2021-11-18 VITALS
DIASTOLIC BLOOD PRESSURE: 116 MMHG | HEART RATE: 53 BPM | BODY MASS INDEX: 30.03 KG/M2 | RESPIRATION RATE: 20 BRPM | HEIGHT: 62 IN | WEIGHT: 163.2 LBS | OXYGEN SATURATION: 98 % | SYSTOLIC BLOOD PRESSURE: 184 MMHG | TEMPERATURE: 98.5 F

## 2021-11-18 DIAGNOSIS — I10 ESSENTIAL HYPERTENSION: ICD-10-CM

## 2021-11-18 LAB
ANION GAP SERPL CALCULATED.3IONS-SCNC: 9 MMOL/L (ref 5–18)
BUN SERPL-MCNC: 10 MG/DL (ref 8–22)
CALCIUM SERPL-MCNC: 9.4 MG/DL (ref 8.5–10.5)
CHLORIDE BLD-SCNC: 105 MMOL/L (ref 98–107)
CO2 SERPL-SCNC: 24 MMOL/L (ref 22–31)
CREAT SERPL-MCNC: 0.76 MG/DL (ref 0.6–1.1)
GFR SERPL CREATININE-BSD FRML MDRD: >90 ML/MIN/1.73M2
GLUCOSE BLD-MCNC: 100 MG/DL (ref 70–125)
POTASSIUM BLD-SCNC: 4.1 MMOL/L (ref 3.5–5)
SODIUM SERPL-SCNC: 138 MMOL/L (ref 136–145)

## 2021-11-18 PROCEDURE — 99213 OFFICE O/P EST LOW 20 MIN: CPT | Mod: GC | Performed by: STUDENT IN AN ORGANIZED HEALTH CARE EDUCATION/TRAINING PROGRAM

## 2021-11-18 PROCEDURE — 36415 COLL VENOUS BLD VENIPUNCTURE: CPT | Performed by: STUDENT IN AN ORGANIZED HEALTH CARE EDUCATION/TRAINING PROGRAM

## 2021-11-18 PROCEDURE — 80048 BASIC METABOLIC PNL TOTAL CA: CPT | Performed by: STUDENT IN AN ORGANIZED HEALTH CARE EDUCATION/TRAINING PROGRAM

## 2021-11-18 RX ORDER — HYDROCHLOROTHIAZIDE 12.5 MG/1
TABLET ORAL
Qty: 60 TABLET | Refills: 4 | Status: SHIPPED | OUTPATIENT
Start: 2021-11-18 | End: 2022-12-09

## 2021-11-18 RX ORDER — AMLODIPINE BESYLATE 10 MG/1
10 TABLET ORAL DAILY
Qty: 60 TABLET | Refills: 4 | Status: SHIPPED | OUTPATIENT
Start: 2021-11-18 | End: 2022-12-09

## 2021-11-18 ASSESSMENT — MIFFLIN-ST. JEOR: SCORE: 1350.11

## 2021-11-18 NOTE — LETTER
December 1, 2021      Demetra Rodrigues  [address] [place] [facility] [address]        Dear ,    We are writing to inform you of your test results.    Normal result.    Resulted Orders   Basic metabolic panel   Result Value Ref Range    Sodium 138 136 - 145 mmol/L    Potassium 4.1 3.5 - 5.0 mmol/L    Chloride 105 98 - 107 mmol/L    Carbon Dioxide (CO2) 24 22 - 31 mmol/L    Anion Gap 9 5 - 18 mmol/L    Urea Nitrogen 10 8 - 22 mg/dL    Creatinine 0.76 0.60 - 1.10 mg/dL    Calcium 9.4 8.5 - 10.5 mg/dL    Glucose 100 70 - 125 mg/dL    GFR Estimate >90 >60 mL/min/1.73m2      Comment:      As of July 11, 2021, eGFR is calculated by the CKD-EPI creatinine equation, without race adjustment. eGFR can be influenced by muscle mass, exercise, and diet. The reported eGFR is an estimation only and is only applicable if the renal function is stable.       If you have any questions or concerns, please call the clinic at the number listed above.       Sincerely,      Dr. Khan

## 2021-11-18 NOTE — PROGRESS NOTES
[facility] Clinic Note    Patient: Demetra Rodrigues  : 1980  MRN: [id number]      ASSESSMENT AND PLAN   Demetra was seen today for blood pressure recheck.  Home readings run between 127-130/80.  Denies medication side effect.  Patient run out of medication for 1-1/2 weeks during which her blood pressure elevated, denies symptoms.  Patient on amlodipine 10 mg and HCTZ 12.5 mg daily.Blood pressure in the clinic was high and that is due t lack of medication.  Patient is still smoking and working on smoking cessation.  Able to cut down from 1 pack/week to 1 pack/2 weeks.  Her goal is to lose around 25 pounds from her weight    Diagnoses and all orders for this visit:    Essential hypertension  -     amLODIPine (NORVASC) 10 MG tablet; Take 1 tablet (10 mg) by mouth daily  -     hydrochlorothiazide (HYDRODIURIL) 12.5 MG tablet; TAKE 1 TABLET(12.5 MG) BY MOUTH DAILY  -     Basic metabolic panel               Return to clinic if develops new or worsening symptoms.    Patient was discussed with attending physician, Dr. Markell Hancock MD, who agrees with the assessment and plan.    Salvador Khan, PGY-2  [facility]  2021                   SUBJECTIVE       Demetra Rodrigues is a 41 year old female with a PMH significant for:  Patient Active Problem List   Diagnosis     Health Care Home     Headache     HTN (hypertension)     Fatigue     S/P LEEP of cervix     Abnormal Pap smear of cervix     She presents to clinic today for a well checkup and medication refill.  Patient does check her blood pressure at home which runs between 127-130/80.  Patient will was compliance with her medication, denies medication side effects.  Patient on amlodipine 10 mg and HCTZ 12.5 mg daily.  Patient run out of medication for the last 1-1/2 weeks.  Every time check her blood pressure was elevated, but denies any symptoms of headache, change in vision, chest pain, or heart palpitation.  Patient endorses some decrease  "in her weight.  Patient is a smoker, used to smoke 1 pack/week for 18 years and now cut down to 1 pack every 2 weeks.  Overall patient stated feeling better about her health.    Past Medical History, Past Surgical History, Medications, Allergies, and Family History were reviewed and updated as needed.        REVIEW OF SYSTEMS     CONSTITUTIONAL: No fever or chills. No fatigue, unintentional changes in weight, or change in appetite.  HEENT: No headache or neck pain. No recent changes in vision, eye redness, dry eyes. No runny nose, nasal congestion, sinus pain, or sore throat.  SKIN: No rashes, bruises, jaundice, or skin lesions.  RESPIRATORY: No cough, wheezes, or SOB.  CARDIOVASCULAR: No chest pain. No palpitations or irregular heart beats. No syncope. No lower extremity swelling.  GASTROINTESTINAL: No N/V, D/C. No abdominal pain, or bloating.  GENITOURINARY: No hematuria, dysuria, changes in frequency, or leaking of urine.  MUSCULOSKELETAL:No muscle stiffness or pain. No joint pain or swelling.        OBJECTIVE     Vitals:    11/18/21 1032 11/18/21 1034   BP: (!) 176/116 (!) 184/116   BP Location: Left arm    Patient Position: Sitting    Cuff Size: Adult Regular    Pulse: 58 53   Resp: 20    Temp: 98.5  F (36.9  C)    TempSrc: Oral    SpO2: 98%    Weight: 74 kg (163 lb 3.2 oz)    Height: 1.577 m (5' 2.1\")      Body mass index is 29.75 kg/m .    Physical Exam:  GENERAL: Awake, alert. Well-nourished.No acute distress.   HEENT: Head: Normocephalic and atraumatic.  Eyes: PERLLA, EOM intact, no scleral icterus or conjunctival injection.   Oropharynx: mucus membranes pink and moist; tonsils without enlargement, erythema or exudates.  NECK: No anterior or posterior cervical lymphadenopathy, no supraclavicular lymphadenopathy.   SKIN: Warm and dry. No rashes, lesions, erythema, petechiae, purpura, ecchymosis, or jaundice.  LUNGS: CTA bilaterally throughout all lung fields with no crackles or wheezing.  CARDIOVASCULAR: " Regular rate and rhythm; normal S1 and S2; no S3 or S4; no murmur, rub or click.   ABDOMEN: Non-distended. Soft and non-tender in all quadrants; no masses or hepatosplenomegally.  Neurology; cranial nerve III to XII grossly intact      LABORATORY:  No results found for this or any previous visit (from the past 24 hour(s)).

## 2021-11-18 NOTE — PATIENT INSTRUCTIONS
Thank you for coming in today to the clinic  Your blood pressure seem to be under control while taking your medication  Your blood pressure medication sent to your pharmacy

## 2021-12-16 ENCOUNTER — OFFICE VISIT (OUTPATIENT)
Dept: FAMILY MEDICINE | Facility: CLINIC | Age: 41
End: 2021-12-16
Payer: COMMERCIAL

## 2021-12-16 VITALS
HEIGHT: 63 IN | SYSTOLIC BLOOD PRESSURE: 125 MMHG | WEIGHT: 165.6 LBS | HEART RATE: 68 BPM | RESPIRATION RATE: 18 BRPM | OXYGEN SATURATION: 97 % | BODY MASS INDEX: 29.34 KG/M2 | DIASTOLIC BLOOD PRESSURE: 85 MMHG | TEMPERATURE: 98.7 F

## 2021-12-16 DIAGNOSIS — Z12.4 SCREENING FOR CERVICAL CANCER: ICD-10-CM

## 2021-12-16 DIAGNOSIS — L20.84 INTRINSIC ECZEMA: ICD-10-CM

## 2021-12-16 DIAGNOSIS — Z71.6 ENCOUNTER FOR TOBACCO USE CESSATION COUNSELING: ICD-10-CM

## 2021-12-16 DIAGNOSIS — B07.0 PLANTAR WARTS: ICD-10-CM

## 2021-12-16 DIAGNOSIS — Z11.51 SCREENING FOR HUMAN PAPILLOMAVIRUS: ICD-10-CM

## 2021-12-16 DIAGNOSIS — Z11.59 ENCOUNTER FOR HEPATITIS C SCREENING TEST FOR LOW RISK PATIENT: ICD-10-CM

## 2021-12-16 DIAGNOSIS — M25.572 PAIN IN JOINT, ANKLE AND FOOT, LEFT: ICD-10-CM

## 2021-12-16 DIAGNOSIS — Z12.31 VISIT FOR SCREENING MAMMOGRAM: ICD-10-CM

## 2021-12-16 DIAGNOSIS — Z13.220 SCREENING FOR LIPID DISORDERS: ICD-10-CM

## 2021-12-16 DIAGNOSIS — Z00.00 ROUTINE GENERAL MEDICAL EXAMINATION AT A HEALTH CARE FACILITY: Primary | ICD-10-CM

## 2021-12-16 DIAGNOSIS — Z11.4 SCREENING FOR HIV (HUMAN IMMUNODEFICIENCY VIRUS): ICD-10-CM

## 2021-12-16 LAB
CHOLEST SERPL-MCNC: 219 MG/DL
FASTING STATUS PATIENT QL REPORTED: ABNORMAL
HDLC SERPL-MCNC: 56 MG/DL
HIV 1+2 AB+HIV1 P24 AG SERPL QL IA: NEGATIVE
LDLC SERPL CALC-MCNC: 137 MG/DL
TRIGL SERPL-MCNC: 128 MG/DL
URATE SERPL-MCNC: 6.1 MG/DL (ref 2–7.5)

## 2021-12-16 PROCEDURE — 80061 LIPID PANEL: CPT | Performed by: STUDENT IN AN ORGANIZED HEALTH CARE EDUCATION/TRAINING PROGRAM

## 2021-12-16 PROCEDURE — 36415 COLL VENOUS BLD VENIPUNCTURE: CPT | Performed by: STUDENT IN AN ORGANIZED HEALTH CARE EDUCATION/TRAINING PROGRAM

## 2021-12-16 PROCEDURE — 87624 HPV HI-RISK TYP POOLED RSLT: CPT | Performed by: STUDENT IN AN ORGANIZED HEALTH CARE EDUCATION/TRAINING PROGRAM

## 2021-12-16 PROCEDURE — 84550 ASSAY OF BLOOD/URIC ACID: CPT | Performed by: STUDENT IN AN ORGANIZED HEALTH CARE EDUCATION/TRAINING PROGRAM

## 2021-12-16 PROCEDURE — 86803 HEPATITIS C AB TEST: CPT | Performed by: STUDENT IN AN ORGANIZED HEALTH CARE EDUCATION/TRAINING PROGRAM

## 2021-12-16 PROCEDURE — 87389 HIV-1 AG W/HIV-1&-2 AB AG IA: CPT | Performed by: STUDENT IN AN ORGANIZED HEALTH CARE EDUCATION/TRAINING PROGRAM

## 2021-12-16 PROCEDURE — 99396 PREV VISIT EST AGE 40-64: CPT | Mod: GC | Performed by: STUDENT IN AN ORGANIZED HEALTH CARE EDUCATION/TRAINING PROGRAM

## 2021-12-16 PROCEDURE — G0123 SCREEN CERV/VAG THIN LAYER: HCPCS | Performed by: STUDENT IN AN ORGANIZED HEALTH CARE EDUCATION/TRAINING PROGRAM

## 2021-12-16 RX ORDER — TRIAMCINOLONE ACETONIDE 1 MG/G
CREAM TOPICAL 2 TIMES DAILY
Qty: 15 G | Refills: 3 | Status: SHIPPED | OUTPATIENT
Start: 2021-12-16 | End: 2023-03-27

## 2021-12-16 ASSESSMENT — MIFFLIN-ST. JEOR: SCORE: 1367.35

## 2021-12-16 NOTE — PATIENT INSTRUCTIONS
Plan for today:    We did a physical and pap smear today. I will call you with the results.     Labs and XR after visit today.    Cream has been sent to pharmacy.    IUD was placed 11/8/2017. This will be due for replacement next year (5 years after insertion).     Preventive Health Recommendations  Female Ages 40 to 49    Yearly exam:     See your health care provider every year in order to  1. Review health changes.   2. Discuss preventive care.    3. Review your medicines if your doctor prescribed any.      Get a Pap test every three years (unless you have an abnormal result and your provider advises testing more often).      If you get Pap tests with HPV test, you only need to test every 5 years, unless you have an abnormal result. You do not need a Pap test if your uterus was removed (hysterectomy) and you have not had cancer.      You should be tested each year for STDs (sexually transmitted diseases), if you're at risk.     Ask your doctor if you should have a mammogram.      Have a colonoscopy (test for colon cancer) if someone in your family has had colon cancer or polyps before age 50.       Have a cholesterol test every 5 years.       Have a diabetes test (fasting glucose) after age 45. If you are at risk for diabetes, you should have this test every 3 years.    Shots: Get a flu shot each year. Get a tetanus shot every 10 years.     Nutrition:     Eat at least 5 servings of fruits and vegetables each day.    Eat whole-grain bread, whole-wheat pasta and brown rice instead of white grains and rice.    Get adequate Calcium and Vitamin D.      Lifestyle    Exercise at least 150 minutes a week (an average of 30 minutes a day, 5 days a week). This will help you control your weight and prevent disease.    Limit alcohol to one drink per day.    No smoking.     Wear sunscreen to prevent skin cancer.    See your dentist every six months for an exam and cleaning.

## 2021-12-16 NOTE — PROGRESS NOTES
Female Physical Note    Concerns today:     Ankle: Would like an x-ray of her left ankle today.  She states that she has had a work-up for an ankle injury in the past, she has had swelling and pain on the lateral side for years.  She did see Ortho about this, sounds like there is injury to the growth plate as a young child and her left leg is shorter than the right.  She used to like to run but she is unable to now due to pain.  She does elevate the ankle and sometimes use an Ace wrap which helps.  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  No redness or warmth at the joint line.    Wart: Has a plantar wart on the inside of her left thumb, she would like this frozen today.      ROS:  CONSTITUTIONAL: no fatigue, no unexpected change in weight  SKIN: eczema on hand, wart as above  EYES: no acute vision problems or changes  ENT: no ear problems, no mouth problems, no throat problems  RESP: no significant cough, no shortness of breath  CV: no chest pain, no palpitations, no new or worsening peripheral edema  GI: no nausea, no vomiting, no constipation, no diarrhea  : no frequency, no dysuria, no hematuria  MS: see ankle above  NEURO: no weakness, no dizziness, no syncope, no headaches  ENDOCRINE: no temperature intolerance, no skin/hair changes  HEME: no easy bruising, no bleeding problems  PSYCHIATRIC: NEGATIVE for changes in mood or affect    Sexually Active: Yes  Sexual concerns: No   Contraception:IUD   P:5  Menarche: No LMP recorded. (Menstrual status: IUD).   STD History: Neg  Last Pap Smear Date:  normal  Abnormal Pap History: Details: History of colp + LEEP >10 years ago ()    Patient Active Problem List   Diagnosis     Health Care Home     Headache     HTN (hypertension)     Fatigue     S/P LEEP of cervix     Abnormal Pap smear of cervix       Current Outpatient Medications   Medication Sig Dispense Refill     amLODIPine (NORVASC) 10 MG tablet Take 1 tablet (10 mg) by mouth  daily 60 tablet 4     hydrochlorothiazide (HYDRODIURIL) 12.5 MG tablet TAKE 1 TABLET(12.5 MG) BY MOUTH DAILY 60 tablet 4     Levonorgestrel (MIRENA IU) As directed (Patient not taking: Reported on 11/18/2021)         Past Medical History:   Diagnosis Date     Full dentures      Hypertension         Family History     Problem (# of Occurrences) Relation (Name,Age of Onset)    Cerebrovascular Disease (1) Father    Chronic Obstructive Pulmonary Disease (1) Mother    Circulatory (1) Sister    Coronary Artery Disease (1) Mother    Diabetes (1) Sister    Hyperlipidemia (1) Father    Hypertension (1) Mother       Negative family history of: Cancer          Problem List Medication List and Allergy List were reviewed.    Patient is an established patient of this clinic..    Social History     Tobacco Use     Smoking status: Current Some Day Smoker     Years: 3.00     Types: Cigarettes     Smokeless tobacco: Never Used     Tobacco comment: occ 2-3 cigarettes per day. Not ready to quit.   Substance Use Topics     Alcohol use: Yes     Single  Children ? yes - 5  children    Has anyone hurt you physically, for example by pushing, hitting, slapping or kicking you or forcing you to have sex? Denies  Do you feel threatened or controlled by a partner, ex-partner or anyone in your life? Denies    RISK BEHAVIORS AND HEALTHY HABITS:  Tobacco Use/Smoking: Details - about 3 or 4 cigarettes a week  Illicit Drug Use: None  Do you use alcohol? Yes - socially  Diet (5-7 servings of fruits/veg daily): Yes   Exercise (30 min accumulated most days):Sometimes  Dental Care: Yes   Calcium 1500 mg/d:  Yes  Seat Belt Use: Yes     Cholesterol Level (>46 yo or at risk):  Recommended and patient accepted testing., Pap/HPV cotest every 5 years for women 30-65   Recommended and patient accepted testing. and HIV screening:  Recommended and patient accepted testing.  Breast CA Screening (>39 yo or 10 y before 1st degree relative diagnosis): Recommended and  "patient accepted testing.    The 10-year ASCVD risk score ([place] HODA Johnson, et al., 2013) is: 6.3%    Values used to calculate the score:      Age: 41 years      Sex: Female      Is Non- : No      Diabetic: No      Tobacco smoker: Yes      Systolic Blood Pressure: 125 mmHg      Is BP treated: Yes      HDL Cholesterol: 41.7 mg/dL      Total Cholesterol: 220.1 mg/dL    Immunization History   Administered Date(s) Administered     COVID-19,PF,Pfizer (12+ Yrs) 05/19/2021, 06/09/2021     FLU 6-35 months 10/10/2020     Flu, Unspecified 10/29/2018     HepA-Adult 06/14/2019     HepB-Adult 02/06/2019, 06/14/2019     Historical DTP/aP 03/22/1988     Influenza (IIV3) PF 10/12/2004     Influenza Vaccine IM > 6 months Valent IIV4 (Alfuria,Fluzone) 10/14/2021     Influenza Vaccine, 6+MO IM (QUADRIVALENT W/PRESERVATIVES) 09/09/2019     OPV, trivalent, live 03/22/1988     Td (Adult), Adsorbed 06/14/2019     Tdap (Adacel,Boostrix) 04/04/2007    Reviewed Immunization Record Today    EXAMINATION:   /85   Pulse 68   Temp 98.7  F (37.1  C)   Resp 18   Ht 1.588 m (5' 2.5\")   Wt 75.1 kg (165 lb 9.6 oz)   SpO2 97%   BMI 29.81 kg/m    GENERAL: healthy, alert and no distress  EYES: Eyes grossly normal to inspection, extraocular movements - intact, and PERRL  HENT: ear canals- normal; TMs- normal; Nose- normal; Mouth- no ulcers, no lesions  NECK: no tenderness, no adenopathy, no asymmetry, no masses, no stiffness; thyroid- normal to palpation  RESP: lungs clear to auscultation - no rales, no rhonchi, no wheezes  BREAST: Declined by patient  CV: regular rates and rhythm, normal S1 S2, no S3 or S4 and no murmur, no click or rub -  ABDOMEN: soft, no tenderness, no  hepatosplenomegaly, no masses, normal bowel sounds  MS: extremities- no gross deformities noted, no edema  SKIN: no suspicious lesions.  There is a 2.5 cm round plantar wart on the inside of the left thumb.  There is a patch of textured, normal " pigmented patch on the right hand near the thenar emesis consistent with eczema  NEURO: strength and tone- normal, sensory exam- grossly normal, mentation- intact, speech- normal, reflexes- symmetric  BACK: no CVA tenderness, no paralumbar tenderness  - female: cervix- normal, adnexae- normal; uterus- normal, no masses, no discharge  RECTAL- female: no masses, no hemorrhoids  PSYCH: Alert and oriented times 3; speech- coherent , normal rate and volume; able to articulate logical thoughts, able to abstract reason, no tangential thoughts, no hallucinations or delusions, affect- normal  LYMPHATICS: ant. cervical- normal, post. cervical- normal, axillary- normal, supraclavicular- normal, inguinal- normal    ASSESSMENT AND PLAN:    Routine general medical examination at a health care facility  Anticipatory guidance and preventative health screenings provided, history updated and reviewed.  Physical exam within normal limits but see many plan below.    Visit for screening mammogram  - MA SCREENING DIGITAL BILAT; Future    Screening for cervical cancer  Screening for human papillomavirus  - Gynecologic Cytology (PAP)    Screening for lipid disorders  - Lipid Profile; Future  - Lipid Profile    Plantar warts  Cryotherapy applied x 4 today.  Recommend return to clinic in 4 weeks for repeat application if no improvement.    Encounter for tobacco use cessation counseling  She is working on cutting back and does not need additional resources today.    Pain in joint, ankle and foot, left  Unclear etiology, may be secondary to musculoskeletal defect due to history of growth plate injury.  However, as it is unilateral and along her joint line we will also check a uric acid today to make sure this is not type for gout.  Consider PT versus orthopedic referral pending results.  - XR Ankle Left 2 Views; Future  - Uric acid; Future  - Uric acid    Screening for HIV (human immunodeficiency virus)  - HIV Antigen Antibody Combo;  Future  - HIV Antigen Antibody Combo    Encounter for hepatitis C screening test for low risk patient  - Hepatitis C antibody; Future  - Hepatitis C antibody    Intrinsic eczema  - triamcinolone (KENALOG) 0.1 % external cream; Apply topically 2 times daily Apply to eczema on hand  Dispense: 15 g; Refill: 3    Alyssa Martinez MD PGY3  [facility]    Seen and discussed with Dr. Hancock

## 2021-12-16 NOTE — PROGRESS NOTES
Preceptor Attestation:    I discussed the patient with the resident and evaluated the patient in person. I was present for and supervised the key portions of the procedure. I have verified the content of the note, which accurately reflects my assessment of the patient and the plan of care.   Supervising Physician:  Markell Hancock MD.

## 2021-12-17 LAB — HCV AB SERPL QL IA: NEGATIVE

## 2021-12-20 LAB
HUMAN PAPILLOMA VIRUS 16 DNA: NEGATIVE
HUMAN PAPILLOMA VIRUS 18 DNA: NEGATIVE
HUMAN PAPILLOMA VIRUS FINAL DIAGNOSIS: NORMAL
HUMAN PAPILLOMA VIRUS OTHER HR: NEGATIVE

## 2021-12-22 LAB
BKR LAB AP GYN ADEQUACY: NORMAL
BKR LAB AP GYN INTERPRETATION: NORMAL
BKR LAB AP HPV REFLEX: NORMAL
BKR LAB AP PREVIOUS ABNORMAL: NORMAL
PATH REPORT.COMMENTS IMP SPEC: NORMAL
PATH REPORT.COMMENTS IMP SPEC: NORMAL
PATH REPORT.RELEVANT HX SPEC: NORMAL

## 2021-12-23 ENCOUNTER — ANCILLARY PROCEDURE (OUTPATIENT)
Dept: GENERAL RADIOLOGY | Facility: CLINIC | Age: 41
End: 2021-12-23
Attending: FAMILY MEDICINE
Payer: COMMERCIAL

## 2021-12-23 DIAGNOSIS — M25.572 PAIN IN JOINT, ANKLE AND FOOT, LEFT: ICD-10-CM

## 2021-12-23 PROCEDURE — 73600 X-RAY EXAM OF ANKLE: CPT | Mod: LT | Performed by: RADIOLOGY

## 2021-12-27 NOTE — RESULT ENCOUNTER NOTE
Called and left VM for patient.   XR shows arthritis and degenerative change. Ortho referral versus PT recommended. Ok to observe If not bothersome.    Alyssa Martinez MD

## 2023-03-27 ENCOUNTER — OFFICE VISIT (OUTPATIENT)
Dept: FAMILY MEDICINE | Facility: CLINIC | Age: 43
End: 2023-03-27
Payer: COMMERCIAL

## 2023-03-27 VITALS
OXYGEN SATURATION: 100 % | TEMPERATURE: 97.9 F | WEIGHT: 172.8 LBS | HEART RATE: 57 BPM | DIASTOLIC BLOOD PRESSURE: 100 MMHG | BODY MASS INDEX: 31.1 KG/M2 | SYSTOLIC BLOOD PRESSURE: 150 MMHG | RESPIRATION RATE: 16 BRPM

## 2023-03-27 DIAGNOSIS — L20.84 INTRINSIC ECZEMA: ICD-10-CM

## 2023-03-27 DIAGNOSIS — I10 ESSENTIAL HYPERTENSION: ICD-10-CM

## 2023-03-27 LAB
ANION GAP SERPL CALCULATED.3IONS-SCNC: 12 MMOL/L (ref 7–15)
BUN SERPL-MCNC: 11.4 MG/DL (ref 6–20)
CALCIUM SERPL-MCNC: 9.1 MG/DL (ref 8.6–10)
CHLORIDE SERPL-SCNC: 101 MMOL/L (ref 98–107)
CREAT SERPL-MCNC: 0.63 MG/DL (ref 0.51–0.95)
DEPRECATED HCO3 PLAS-SCNC: 24 MMOL/L (ref 22–29)
GFR SERPL CREATININE-BSD FRML MDRD: >90 ML/MIN/1.73M2
GLUCOSE SERPL-MCNC: 104 MG/DL (ref 70–99)
POTASSIUM SERPL-SCNC: 3.9 MMOL/L (ref 3.4–5.3)
SODIUM SERPL-SCNC: 137 MMOL/L (ref 136–145)

## 2023-03-27 PROCEDURE — 80048 BASIC METABOLIC PNL TOTAL CA: CPT | Performed by: STUDENT IN AN ORGANIZED HEALTH CARE EDUCATION/TRAINING PROGRAM

## 2023-03-27 PROCEDURE — 36415 COLL VENOUS BLD VENIPUNCTURE: CPT | Performed by: STUDENT IN AN ORGANIZED HEALTH CARE EDUCATION/TRAINING PROGRAM

## 2023-03-27 PROCEDURE — 99214 OFFICE O/P EST MOD 30 MIN: CPT | Mod: GC | Performed by: STUDENT IN AN ORGANIZED HEALTH CARE EDUCATION/TRAINING PROGRAM

## 2023-03-27 RX ORDER — AMLODIPINE BESYLATE 10 MG/1
10 TABLET ORAL DAILY
Qty: 30 TABLET | Refills: 0 | Status: SHIPPED | OUTPATIENT
Start: 2023-03-27 | End: 2023-04-21

## 2023-03-27 RX ORDER — HYDROCHLOROTHIAZIDE 12.5 MG/1
TABLET ORAL
Qty: 30 TABLET | Refills: 0 | Status: SHIPPED | OUTPATIENT
Start: 2023-03-27 | End: 2023-04-21

## 2023-03-27 RX ORDER — TRIAMCINOLONE ACETONIDE 1 MG/G
CREAM TOPICAL 2 TIMES DAILY
Qty: 15 G | Refills: 3 | Status: SHIPPED | OUTPATIENT
Start: 2023-03-27 | End: 2023-04-21

## 2023-03-27 NOTE — PROGRESS NOTES
Assessment and Plan    Demetra was seen today for hypertension.  On amlodipine 10 mg and HCTZ 12.5 mg.  No medication side effect reported.  Noncompliance with her medications.  Recent history of high blood pressure that is associated with headache due to to not taking medication for 3 days.  Patient was consulted regarding the importance of blood pressure control.  Patient was prescribed blood pressure cuff, recommended checking blood pressure and and bring results for upcoming follow-up.    Diagnoses and all orders for this visit:    Essential hypertension  -     amLODIPine (NORVASC) 10 MG tablet; Take 1 tablet (10 mg) by mouth daily  -     hydrochlorothiazide (HYDRODIURIL) 12.5 MG tablet; TAKE 1 TABLET(12.5 MG) BY MOUTH DAILY  -     Basic metabolic panel  -     Home Blood Pressure Monitor Order for DME - ONLY FOR DME    Intrinsic eczema  -     triamcinolone (KENALOG) 0.1 % external cream; Apply topically 2 times daily Apply to eczema on hand             Options for treatment and follow-up care were reviewed with the patient. Patient engaged in the decision making process and verbalized understanding of the options discussed and agreed with the final plan.    Patient was staffed with attending physician MD Salvador Flanagan MD PGY3           HPI       Demetra Rodrigues is a 42 year old year old female w/ PMH of   Patient Active Problem List   Diagnosis     Health Care Home     Headache     HTN (hypertension)     Fatigue     S/P LEEP of cervix     Abnormal Pap smear of cervix    who presents for blood pressure check.  Patient on amlodipine 10 mg and HCTZ 12.5 mg.  Patient noncompliance with medication.  Denies side effects.  5 days prior to patient's visit while she is at work, patient was complaining of headache and blood pressure was found to be high.  During that time patient did not take her medication for 3 days.  Patient is a social smoker, 2 to 3 cigarettes/week for 1 year, does not want to stop smoking.   Patient is trying to eat healthy, would like to lose weight.  No history of regular exercise.  Patient does not check her blood pressure pressure regularly at home.  Denies any other symptoms..      Problem, Medication and Allergy Lists were   reviewed and updated if needed.     Patient Active Problem List    Diagnosis Date Noted     Abnormal Pap smear of cervix      Priority: Medium     12/16/21 nl Pap with neg HPV, pt needs repeat Pap in 5 yrs.  11/8/17 nl Pap, pt needs repeat Pap in 3 yrs 11/2020.  6/6/12 nl Pap with negative HPV, pt needs repeat Pap in 1yr.  6/16/08 nl Pap with +BV, needs repeat Pap in 6mos due 12/2008.  10/22/2007 had LEEP needs to have repeat Pap in 6 mos due 4/2008 4/4/07 [facility] referred to [facility]        Fatigue 06/17/2013     Priority: Medium     HTN (hypertension) 04/10/2013     Priority: Medium     Headache 12/21/2012     Priority: Medium     Problem list name updated by automated process. Provider to review       Health Care Home 12/04/2012     Priority: Medium     DX V65.8 REPLACED WITH 94897 HEALTH CARE HOME (04/08/2013)       S/P LEEP of cervix 10/22/2007     Priority: Medium       Patient is an established patient of this clinic.         Review of Systems:   12 point ROS negative other than as specified above.         Physical Exam:   BP (!) 150/100 (BP Location: Left arm, Patient Position: Sitting, Cuff Size: Adult Regular)   Pulse 57   Temp 97.9  F (36.6  C) (Oral)   Resp 16   Wt 78.4 kg (172 lb 12.8 oz)   SpO2 100%   BMI 31.10 kg/m      Vital signs normal except BP     Exam:  Constitutional: healthy, alert, no distress, and cooperative  Head: Normocephalic. No masses, lesions, tenderness or abnormalities  Neck: Neck supple. No adenopathy.  ENT: throat normal without erythema or exudate  Cardiovascular: RRR w/o audible murmur  Respiratory: bilateral clear lungs w/o wheezing, crackles or rhonchi; breathing comfortably on RA  Gastrointestinal: Abdomen soft, non-tender. BS  normal.  : Deferred  Musculoskeletal: extremities normal- no gross deformities noted, gait normal, and normal muscle tone  Skin: no suspicious lesions or rashes  Neurologic: grossly normal CN; normal strength, sensation, & tone  Psychiatric: mentation appears normal and affect normal/bright        Results:    Results from this visitNo results found for any visits on 03/27/23.

## 2023-03-27 NOTE — PROGRESS NOTES
Preceptor Attestation:    I discussed the patient with the resident and evaluated the patient in person. I have verified the content of the note, which accurately reflects my assessment of the patient and the plan of care.   Supervising Physician:  Gurpreet Loredo MD.

## 2023-04-21 ENCOUNTER — OFFICE VISIT (OUTPATIENT)
Dept: FAMILY MEDICINE | Facility: CLINIC | Age: 43
End: 2023-04-21
Payer: COMMERCIAL

## 2023-04-21 VITALS
DIASTOLIC BLOOD PRESSURE: 86 MMHG | BODY MASS INDEX: 31.54 KG/M2 | HEIGHT: 62 IN | SYSTOLIC BLOOD PRESSURE: 126 MMHG | WEIGHT: 171.4 LBS | RESPIRATION RATE: 20 BRPM | OXYGEN SATURATION: 94 % | TEMPERATURE: 98.8 F | HEART RATE: 83 BPM

## 2023-04-21 DIAGNOSIS — I10 ESSENTIAL HYPERTENSION: ICD-10-CM

## 2023-04-21 DIAGNOSIS — L20.84 INTRINSIC ECZEMA: ICD-10-CM

## 2023-04-21 DIAGNOSIS — Z00.00 ROUTINE GENERAL MEDICAL EXAMINATION AT A HEALTH CARE FACILITY: Primary | ICD-10-CM

## 2023-04-21 PROCEDURE — 91312 COVID-19 VACCINE BIVALENT BOOSTER 12+ (PFIZER): CPT | Performed by: STUDENT IN AN ORGANIZED HEALTH CARE EDUCATION/TRAINING PROGRAM

## 2023-04-21 PROCEDURE — 90746 HEPB VACCINE 3 DOSE ADULT IM: CPT | Performed by: STUDENT IN AN ORGANIZED HEALTH CARE EDUCATION/TRAINING PROGRAM

## 2023-04-21 PROCEDURE — 0124A COVID-19 VACCINE BIVALENT BOOSTER 12+ (PFIZER): CPT | Performed by: STUDENT IN AN ORGANIZED HEALTH CARE EDUCATION/TRAINING PROGRAM

## 2023-04-21 PROCEDURE — 90471 IMMUNIZATION ADMIN: CPT | Performed by: STUDENT IN AN ORGANIZED HEALTH CARE EDUCATION/TRAINING PROGRAM

## 2023-04-21 PROCEDURE — 99396 PREV VISIT EST AGE 40-64: CPT | Mod: 25 | Performed by: STUDENT IN AN ORGANIZED HEALTH CARE EDUCATION/TRAINING PROGRAM

## 2023-04-21 RX ORDER — TRIAMCINOLONE ACETONIDE 1 MG/G
CREAM TOPICAL 2 TIMES DAILY
Qty: 80 G | Refills: 1 | Status: SHIPPED | OUTPATIENT
Start: 2023-04-21

## 2023-04-21 RX ORDER — AMLODIPINE BESYLATE 10 MG/1
10 TABLET ORAL DAILY
Qty: 90 TABLET | Refills: 3 | Status: SHIPPED | OUTPATIENT
Start: 2023-04-21 | End: 2023-12-12

## 2023-04-21 RX ORDER — HYDROCHLOROTHIAZIDE 12.5 MG/1
TABLET ORAL
Qty: 90 TABLET | Refills: 3 | Status: SHIPPED | OUTPATIENT
Start: 2023-04-21 | End: 2023-12-12

## 2023-04-21 ASSESSMENT — ENCOUNTER SYMPTOMS
HEMATOCHEZIA: 0
EYE PAIN: 0
HEMATURIA: 0
WEAKNESS: 0
CHILLS: 0
DYSURIA: 0
BREAST MASS: 0
HEARTBURN: 0
FEVER: 0
HEADACHES: 1
ARTHRALGIAS: 0
FREQUENCY: 0
DIZZINESS: 0
SHORTNESS OF BREATH: 0
SORE THROAT: 0
NERVOUS/ANXIOUS: 0
COUGH: 0
JOINT SWELLING: 0
PARESTHESIAS: 1
ABDOMINAL PAIN: 0
CONSTIPATION: 0
PALPITATIONS: 0
NAUSEA: 0
DIARRHEA: 0
MYALGIAS: 0

## 2023-04-21 NOTE — PROGRESS NOTES
SUBJECTIVE:   CC: Demetra is an 42 year old who presents for preventive health visit.       4/21/2023     2:35 PM   Additional Questions   Roomed by yuridia land   Patient has been advised of split billing requirements and indicates understanding: Yes  Demetra Rodrigues is a 42 year old female patient who presents today for a well check and concerns about her IUD. She has had her IUD removed and reinserted in 2017. She would like to have the position of the IUD checked but denies any complications or issues. As it relates to menopause, she states that she get body temperature fluctuations intermittently but otherwise denies any symptoms. She is also concerned with her HTN medications. She provided her blood pressure readings. She will check in the mornings around 11 am prior to eating.  She endorses that she forgets to take her blood pressure medication some days. We discussed FMHx. Her diastolic blood pressure is consistently on the higher range. BP reading today was 126/86. We discussed the patient's diet she is not on a low salt diet, she is a smoker, and does not exercise. She was switched from Hyzer to Hydrochlorothiazide and Amlodopine because she had dental complications of gum loss due to gingivitis and tooth infections. Now she has dentures and states that she adjusting well with them. She notes that she is mentally tired from work. Her home life is manageable with her  and 17-year-old son. She notes endorses intermittent pain with history of an untreated injury that occurred at 6 moths old. She has been told that she has arthritis in her ankle in addition to plantar fasciitis.     Healthy Habits:     Getting at least 3 servings of Calcium per day:  Yes    Bi-annual eye exam:  Yes    Dental care twice a year:  NO    Sleep apnea or symptoms of sleep apnea:  None    Diet:  Other    Frequency of exercise:  2-3 days/week    Duration of exercise:  30-45 minutes    Taking medications regularly:  Yes    Medication  side effects:  None    PHQ-2 Total Score: 0    Additional concerns today:  Yes        Hypertension Follow-up      Do you check your blood pressure regularly outside of the clinic? Yes - mid-morning, wakes up and takes medication before heading out the door. Has BP machine 11 am.     Are you following a low salt diet? No    Are your blood pressures ever more than 140 on the top number (systolic) OR more   than 90 on the bottom number (diastolic), for example 140/90? Yes - there are days she forgets      Today's PHQ-2 Score:       4/21/2023     2:41 PM   PHQ-2 ( 1999 [employer])   Q1: Little interest or pleasure in doing things 0   Q2: Feeling down, depressed or hopeless 0   PHQ-2 Score 0   Q1: Little interest or pleasure in doing things Not at all   Q2: Feeling down, depressed or hopeless Not at all   PHQ-2 Score 0       Have you ever done Advance Care Planning? (For example, a Health Directive, POLST, or a discussion with a medical provider or your loved ones about your wishes): No, advance care planning information given to patient to review.  Patient plans to discuss their wishes with loved ones or provider.      Social History     Tobacco Use     Smoking status: Some Days     Years: 3.00     Types: Cigarettes     Smokeless tobacco: Never     Tobacco comments:     occ 2-3 cigarettes per day. Not ready to quit.   Vaping Use     Vaping status: Not on file   Substance Use Topics     Alcohol use: Yes             4/21/2023     2:41 PM   Alcohol Use   Prescreen: >3 drinks/day or >7 drinks/week? No          View : No data to display.              Reviewed orders with patient.  Reviewed health maintenance and updated orders accordingly - Yes      Breast Cancer Screening:    FHS-7:        View : No data to display.              click delete button to remove this line now  Mammogram Screening - Offered annual screening and updated Health Maintenance for mutual plan based on risk factor consideration    Pertinent mammograms are  reviewed under the imaging tab.    History of abnormal Pap smear: NO - age 30-65 PAP every 5 years with negative HPV co-testing recommended      Latest Ref Rng & Units 12/16/2021    11:06 AM   PAP / HPV   PAP  Negative for Intraepithelial Lesion or Malignancy (NILM)     HPV 16 DNA Negative Negative     HPV 18 DNA Negative Negative     Other HR HPV Negative Negative       Reviewed and updated as needed this visit by clinical staff   Tobacco  Allergies  Meds             Family History   Problem Relation Age of Onset     Heart Failure Mother      Hypertension Mother      Coronary Artery Disease Mother      Chronic Obstructive Pulmonary Disease Mother      Valvular heart disease Mother      Diabetes Father      Cerebrovascular Disease Father      Hyperlipidemia Father      Cerebrovascular Disease Sister      Hypertension Sister      Circulatory Sister      Diabetes Sister      Cardiomyopathy Niece      Cancer No family hx of          Reviewed and updated as needed this visit by Provider                     Review of Systems   Constitutional: Negative for chills and fever.   HENT: Negative for congestion, ear pain, hearing loss and sore throat.    Eyes: Negative for pain and visual disturbance.   Respiratory: Negative for cough and shortness of breath.    Cardiovascular: Negative for chest pain, palpitations and peripheral edema.   Gastrointestinal: Negative for abdominal pain, constipation, diarrhea, heartburn, hematochezia and nausea.   Breasts:  Negative for tenderness, breast mass and discharge.   Genitourinary: Negative for dysuria, frequency, genital sores, hematuria, pelvic pain, urgency, vaginal bleeding and vaginal discharge.   Musculoskeletal: Negative for arthralgias, joint swelling and myalgias.   Skin: Negative for rash.   Neurological: Positive for headaches and paresthesias. Negative for dizziness and weakness.   Psychiatric/Behavioral: Negative for mood changes. The patient is not nervous/anxious.   "    CONSTITUTIONAL: NEGATIVE for fever, chills, change in weight  INTEGUMENTARU/SKIN: NEGATIVE for worrisome rashes, moles or lesions  EYES: NEGATIVE for vision changes or irritation  ENT: NEGATIVE for ear, mouth and throat problems  RESP: NEGATIVE for significant cough or SOB  BREAST: NEGATIVE for masses, tenderness or discharge  CV: NEGATIVE for chest pain, palpitations or peripheral edema  GI: NEGATIVE for nausea, abdominal pain, heartburn, or change in bowel habits  : NEGATIVE for unusual urinary or vaginal symptoms. Periods are regular.  MUSCULOSKELETAL: NEGATIVE for significant arthralgias or myalgia  NEURO: NEGATIVE for weakness, dizziness or paresthesias  PSYCHIATRIC: NEGATIVE for changes in mood or affect     OBJECTIVE:   /86   Pulse 83   Temp 98.8  F (37.1  C) (Oral)   Resp 20   Ht 1.572 m (5' 1.89\")   Wt 77.7 kg (171 lb 6.4 oz)   SpO2 94%   BMI 31.46 kg/m    Physical Exam  HENT:      Mouth/Throat:      Mouth: Mucous membranes are moist.      Pharynx: Oropharynx is clear.   Cardiovascular:      Rate and Rhythm: Normal rate and regular rhythm.   Pulmonary:      Breath sounds: Normal breath sounds.       GENERAL: healthy, alert and no distress  NECK: no adenopathy, no asymmetry, masses, or scars and thyroid normal to palpation  RESP: lungs clear to auscultation - no rales, rhonchi or wheezes  CV: regular rate and rhythm, normal S1 S2, no S3 or S4, no murmur, click or rub, no peripheral edema and peripheral pulses strong  ABDOMEN: soft, nontender, no hepatosplenomegaly, no masses and bowel sounds normal  MS: no gross musculoskeletal defects noted, no edema        ASSESSMENT/PLAN:   Essential Hypertension:    Refill  Hydrochlorothiazide (Hydrodiuril) 12.5mg tablet    Refill Amlodipine (NORVASC) 10 mg tablet    History of Eczema    Refill triamcinolone (KENALOG) 0.1 % external cream    Health Maintenance and Preventative Care    Hepatitis B vaccination     COVID Bivalent Booster     Patient wants " "to wait until 2024 for Pneumonia Vaccine    Women's Health Maintenance    UTD on pap smears    Patient will begin mammograms 2024    Remove and replace IUD 2024      Demetra was seen today for physical, iud and hypertension.    Diagnoses and all orders for this visit:    Essential hypertension  -     amLODIPine (NORVASC) 10 MG tablet; Take 1 tablet (10 mg) by mouth daily  -     hydrochlorothiazide (HYDRODIURIL) 12.5 MG tablet; TAKE 1 TABLET(12.5 MG) BY MOUTH DAILY    Intrinsic eczema  -     triamcinolone (KENALOG) 0.1 % external cream; Apply topically 2 times daily Apply to eczema on hand    Other orders  -     HEPATITIS B VACCINE ADULT 3 DOSE IM (ENGERIX-B/RECOMBIVAX HB)  -     COVID-19,PF,PFIZER BOOSTER BIVALENT (12+YRS)        Patient has been advised of split billing requirements and indicates understanding: Yes      COUNSELING:  Reviewed preventive health counseling, as reflected in patient instructions       Contraception       Colorectal Cancer Screening       (Arlin)menopause management       Advance Care Planning      BMI:   Estimated body mass index is 31.46 kg/m  as calculated from the following:    Height as of this encounter: 1.572 m (5' 1.89\").    Weight as of this encounter: 77.7 kg (171 lb 6.4 oz).   Weight management plan: Discussed healthy diet and exercise guidelines      She reports that she has been smoking cigarettes. She has never used smokeless tobacco.  Nicotine/Tobacco Cessation Plan:   Information offered: Patient not interested at this time          Scribe Disclosure:   I, JOSH HYLTON, am serving as a scribe; to document services personally performed by Yin Martinez MD -based on data collection and the provider's statements to me.     Provider Disclosure:  I agree with above History, Review of Systems, Physical exam and Plan.  I have reviewed the content of the documentation and have edited it as needed. I have personally performed the services documented here and the documentation " Detail Level: Simple Detail Level: Generalized accurately represents those services and the decisions I have made.      Electronically signed by:  Yin Martinez MD  [facility]      Answers for HPI/ROS submitted by the patient on 4/21/2023  Frequency of exercise:: 2-3 days/week  Getting at least 3 servings of Calcium per day:: Yes  Diet:: Other  Taking medications regularly:: Yes  Medication side effects:: None  Bi-annual eye exam:: Yes  Dental care twice a year:: NO  Sleep apnea or symptoms of sleep apnea:: None  abdominal pain: No  Blood in stool: No  Blood in urine: No  chest pain: No  chills: No  congestion: No  constipation: No  cough: No  diarrhea: No  dizziness: No  ear pain: No  eye pain: No  nervous/anxious: No  fever: No  frequency: No  genital sores: No  headaches: Yes  hearing loss: No  heartburn: No  arthralgias: No  joint swelling: No  peripheral edema: No  mood changes: No  myalgias: No  nausea: No  dysuria: No  palpitations: No  Skin sensation changes: Yes  sore throat: No  urgency: No  rash: No  shortness of breath: No  visual disturbance: No  weakness: No  pelvic pain: No  vaginal bleeding: No  vaginal discharge: No  tenderness: No  breast mass: No  breast discharge: No  Additional concerns today:: Yes  Duration of exercise:: 30-45 minutes       Patient Specific Counseling (Will Not Stick From Patient To Patient): Prescription of bleaching cream was sent to compounding pharmacy

## 2023-04-21 NOTE — PROGRESS NOTES
Prior to immunization administration, verified patients identity using patient s name and date of birth. Please see Immunization Activity for additional information.     Screening Questionnaire for Adult Immunization    Are you sick today?   No   Do you have allergies to medications, food, a vaccine component or latex?   No   Have you ever had a serious reaction after receiving a vaccination?   No   Do you have a long-term health problem with heart, lung, kidney, or metabolic disease (e.g., diabetes), asthma, a blood disorder, no spleen, complement component deficiency, a cochlear implant, or a spinal fluid leak?  Are you on long-term aspirin therapy?   No   Do you have cancer, leukemia, HIV/AIDS, or any other immune system problem?   No   Do you have a parent, brother, or sister with an immune system problem?   No   In the past 3 months, have you taken medications that affect  your immune system, such as prednisone, other steroids, or anticancer drugs; drugs for the treatment of rheumatoid arthritis, Crohn s disease, or psoriasis; or have you had radiation treatments?   No   Have you had a seizure, or a brain or other nervous system problem?   No   During the past year, have you received a transfusion of blood or blood    products, or been given immune (gamma) globulin or antiviral drug?   No   For women: Are you pregnant or is there a chance you could become       pregnant during the next month?   No   Have you received any vaccinations in the past 4 weeks?   No     Immunization questionnaire answers were all negative.      Injection of Hep B and Pfizer Bivalent Booster given by Phyllis Crum. Patient instructed to remain in clinic for 15 minutes afterwards, and to report any adverse reactions.     Screening performed by Phyllis Crum on 4/21/2023 at 3:35 PM.

## 2023-12-12 DIAGNOSIS — I10 ESSENTIAL HYPERTENSION: ICD-10-CM

## 2023-12-12 RX ORDER — HYDROCHLOROTHIAZIDE 12.5 MG/1
TABLET ORAL
Qty: 90 TABLET | Refills: 3 | Status: SHIPPED | OUTPATIENT
Start: 2023-12-12

## 2023-12-12 RX ORDER — AMLODIPINE BESYLATE 10 MG/1
10 TABLET ORAL DAILY
Qty: 90 TABLET | Refills: 3 | Status: SHIPPED | OUTPATIENT
Start: 2023-12-12

## 2023-12-12 NOTE — TELEPHONE ENCOUNTER
[facility] phone call message- patient requesting a refill:    Full Medication Name:     amLODIPine (NORVASC) 10 MG tablet   Sig - Route: Take 1 tablet (10 mg) by mouth daily - Oral     hydrochlorothiazide (HYDRODIURIL) 12.5 MG tablet   Sig: TAKE 1 TABLET(12.5 MG) BY MOUTH DAILY       Pharmacy confirmed as   [address] [place] [id number]  Phone: [phone]  : Yes    Additional Comments:     Patient want to let her doctor know she been out for a week now    OK to leave a message on voice mail? Yes    Primary language: English      needed? No    Call taken on December 12, 2023 at 2:46 PM by Albertina Crum

## 2025-07-05 ENCOUNTER — HEALTH MAINTENANCE LETTER (OUTPATIENT)
Age: 45
End: 2025-07-05